# Patient Record
Sex: FEMALE | Race: WHITE | NOT HISPANIC OR LATINO | Employment: FULL TIME | URBAN - METROPOLITAN AREA
[De-identification: names, ages, dates, MRNs, and addresses within clinical notes are randomized per-mention and may not be internally consistent; named-entity substitution may affect disease eponyms.]

---

## 2018-09-17 ENCOUNTER — HISTORICAL (OUTPATIENT)
Dept: ADMINISTRATIVE | Facility: HOSPITAL | Age: 63
End: 2018-09-17

## 2018-10-22 ENCOUNTER — HISTORICAL (OUTPATIENT)
Dept: ADMINISTRATIVE | Facility: HOSPITAL | Age: 63
End: 2018-10-22

## 2018-12-10 ENCOUNTER — HISTORICAL (OUTPATIENT)
Dept: ADMINISTRATIVE | Facility: HOSPITAL | Age: 63
End: 2018-12-10

## 2019-09-09 ENCOUNTER — HISTORICAL (OUTPATIENT)
Dept: RADIOLOGY | Facility: HOSPITAL | Age: 64
End: 2019-09-09

## 2019-09-16 ENCOUNTER — HISTORICAL (OUTPATIENT)
Dept: ADMINISTRATIVE | Facility: HOSPITAL | Age: 64
End: 2019-09-16

## 2019-10-07 ENCOUNTER — HISTORICAL (OUTPATIENT)
Dept: ADMINISTRATIVE | Facility: HOSPITAL | Age: 64
End: 2019-10-07

## 2019-10-28 ENCOUNTER — HISTORICAL (OUTPATIENT)
Dept: ADMINISTRATIVE | Facility: HOSPITAL | Age: 64
End: 2019-10-28

## 2019-11-25 ENCOUNTER — HISTORICAL (OUTPATIENT)
Dept: ADMINISTRATIVE | Facility: HOSPITAL | Age: 64
End: 2019-11-25

## 2020-10-29 ENCOUNTER — HISTORICAL (OUTPATIENT)
Dept: ADMINISTRATIVE | Facility: HOSPITAL | Age: 65
End: 2020-10-29

## 2020-11-22 ENCOUNTER — OFFICE VISIT (OUTPATIENT)
Dept: URGENT CARE | Facility: CLINIC | Age: 65
End: 2020-11-22
Payer: COMMERCIAL

## 2020-11-22 VITALS
TEMPERATURE: 99 F | DIASTOLIC BLOOD PRESSURE: 85 MMHG | SYSTOLIC BLOOD PRESSURE: 139 MMHG | WEIGHT: 149 LBS | OXYGEN SATURATION: 99 % | RESPIRATION RATE: 18 BRPM | BODY MASS INDEX: 23.39 KG/M2 | HEIGHT: 67 IN | HEART RATE: 70 BPM

## 2020-11-22 DIAGNOSIS — U07.1 COVID-19 VIRUS DETECTED: ICD-10-CM

## 2020-11-22 DIAGNOSIS — Z11.9 SCREENING EXAMINATION FOR UNSPECIFIED INFECTIOUS DISEASE: ICD-10-CM

## 2020-11-22 DIAGNOSIS — U07.1 COVID-19 VIRUS DETECTED: Primary | ICD-10-CM

## 2020-11-22 LAB
CTP QC/QA: YES
SARS-COV-2 RDRP RESP QL NAA+PROBE: POSITIVE

## 2020-11-22 PROCEDURE — 3008F BODY MASS INDEX DOCD: CPT | Mod: CPTII,S$GLB,, | Performed by: FAMILY MEDICINE

## 2020-11-22 PROCEDURE — 3008F PR BODY MASS INDEX (BMI) DOCUMENTED: ICD-10-PCS | Mod: CPTII,S$GLB,, | Performed by: FAMILY MEDICINE

## 2020-11-22 PROCEDURE — 99202 PR OFFICE/OUTPT VISIT, NEW, LEVL II, 15-29 MIN: ICD-10-PCS | Mod: S$GLB,,, | Performed by: FAMILY MEDICINE

## 2020-11-22 PROCEDURE — 99202 OFFICE O/P NEW SF 15 MIN: CPT | Mod: S$GLB,,, | Performed by: FAMILY MEDICINE

## 2020-11-22 PROCEDURE — U0002: ICD-10-PCS | Mod: QW,S$GLB,, | Performed by: FAMILY MEDICINE

## 2020-11-22 PROCEDURE — U0002 COVID-19 LAB TEST NON-CDC: HCPCS | Mod: QW,S$GLB,, | Performed by: FAMILY MEDICINE

## 2020-11-22 RX ORDER — BENZONATATE 200 MG/1
200 CAPSULE ORAL 3 TIMES DAILY PRN
Qty: 30 CAPSULE | Refills: 1 | Status: SHIPPED | OUTPATIENT
Start: 2020-11-22 | End: 2020-12-02

## 2020-11-22 NOTE — PATIENT INSTRUCTIONS
You have tested positive for COVID-19 today. Per the CDC, you are now in a 10 day isolation.    This isolation starts from the day you first developed symptoms, not the day of your positive test. For example, if your symptoms began on a Monday, and you waited until Friday of the same week to get tested, and it was positive, your 10 day isolation begins from that Monday, not the Friday you tested positive.  However, if you are asymptomatic (a person who does not have any symptoms), and received a COVID-19 test that was positive, your 10 day isolation begins on the day you tested positive. This is regardless if you were exposed to a known positive days earlier. So for example, if you test positive as an asymptomatic on day 7 from exposure, you have now extended your 14 day quarantine to a 17 day quarantine.    Also, per the CDC guidelines, once your 10 days have passed, and you have not had fever greater than 100.4F in the last 24 hours without taking any fever reducers such as Tylenol (Acetaminophen) or Motrin (Ibuprofen), you may return to your normal activities including social distancing, wearing masks, and frequent handwashing - YOU DO NOT NEED ANOTHER TEST, OR TO TEST NEGATIVE, IN ORDER TO END YOUR QUARANTINE!

## 2020-11-22 NOTE — PROGRESS NOTES
"Subjective:       Patient ID: Shelby Taveras is a 65 y.o. female.    Vitals:  height is 5' 6.5" (1.689 m) and weight is 67.6 kg (149 lb). Her temperature is 99 °F (37.2 °C). Her blood pressure is 139/85 and her pulse is 70. Her respiration is 18 and oxygen saturation is 99%.     Chief Complaint: URI    65-year-old female who lives and [place] who started with some chills and a cough last night.  Her  was recently exposed to COVID, although he had a recent negative test.  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  Her  was advised to reschedule his upcoming elective surgery.  She has no comorbidities.  No history of asthma or reactive airways.    URI   This is a new problem. There has been no fever. Associated symptoms include coughing. Pertinent negatives include no congestion, ear pain, nausea, rash, sinus pain, sore throat, vomiting or wheezing. Treatments tried: otc cough.       Constitution: Positive for chills. Negative for sweating, fatigue and fever.   HENT: Negative for ear pain, congestion, sinus pain, sinus pressure, sore throat and voice change.    Neck: Negative for painful lymph nodes.   Eyes: Negative for eye redness.   Respiratory: Positive for cough. Negative for chest tightness, sputum production, bloody sputum, COPD, shortness of breath, stridor, wheezing and asthma.    Gastrointestinal: Negative for nausea and vomiting.   Musculoskeletal: Negative for muscle ache.   Skin: Negative for rash.   Allergic/Immunologic: Negative for seasonal allergies and asthma.   Hematologic/Lymphatic: Negative for swollen lymph nodes.       Objective:      Physical Exam   Constitutional: She is oriented to person, place, and time. She appears well-developed. She is cooperative.  Non-toxic appearance. She does not appear ill. No distress.   HENT:   Head: Normocephalic and atraumatic.   Ears:   Right Ear: Hearing and external ear normal.   Left Ear: Hearing and external ear normal. "   Nose: Nose normal. No mucosal edema, rhinorrhea or nasal deformity. No epistaxis. Right sinus exhibits no maxillary sinus tenderness and no frontal sinus tenderness. Left sinus exhibits no maxillary sinus tenderness and no frontal sinus tenderness.   Mouth/Throat: Uvula is midline, oropharynx is clear and moist and mucous membranes are normal. No trismus in the jaw. Normal dentition. No uvula swelling. No oropharyngeal exudate, posterior oropharyngeal edema or posterior oropharyngeal erythema.   Eyes: Conjunctivae and lids are normal. No scleral icterus.   Neck: Trachea normal, full passive range of motion without pain and phonation normal. Neck supple. No neck rigidity. No edema and no erythema present.   Cardiovascular: Normal rate, regular rhythm, normal heart sounds and normal pulses.   Pulmonary/Chest: Effort normal and breath sounds normal. No respiratory distress. She has no decreased breath sounds. She has no wheezes. She has no rhonchi. She has no rales.   Abdominal: Normal appearance.   Musculoskeletal: Normal range of motion.         General: No deformity.   Neurological: She is alert and oriented to person, place, and time. She exhibits normal muscle tone. Coordination normal.   Skin: Skin is warm, dry, intact, not diaphoretic and not pale. Psychiatric: Her speech is normal and behavior is normal. Judgment and thought content normal.   Nursing note and vitals reviewed.        Results for orders placed or performed in visit on 11/22/20   POCT COVID-19 Rapid Screening   Result Value Ref Range    POC Rapid COVID Positive (A) Negative     Acceptable Yes      Assessment:       1. COVID-19 virus detected    2. Screening examination for unspecified infectious disease        Plan:     - she is driving back to [place] today.  She was advised to touch base with her PCP in [place] tomorrow.    COVID-19 virus detected  -     benzonatate (TESSALON) 200 MG capsule; Take 1 capsule (200 mg total) by  mouth 3 (three) times daily as needed for Cough.  Dispense: 30 capsule; Refill: 1    Screening examination for unspecified infectious disease  -     POCT COVID-19 Rapid Screening    You have tested positive for COVID-19 today. Per the CDC, you are now in a 10 day isolation.    This isolation starts from the day you first developed symptoms, not the day of your positive test. For example, if your symptoms began on a Monday, and you waited until Friday of the same week to get tested, and it was positive, your 10 day isolation begins from that Monday, not the Friday you tested positive.  However, if you are asymptomatic (a person who does not have any symptoms), and received a COVID-19 test that was positive, your 10 day isolation begins on the day you tested positive. This is regardless if you were exposed to a known positive days earlier. So for example, if you test positive as an asymptomatic on day 7 from exposure, you have now extended your 14 day quarantine to a 17 day quarantine.    Also, per the CDC guidelines, once your 10 days have passed, and you have not had fever greater than 100.4F in the last 24 hours without taking any fever reducers such as Tylenol (Acetaminophen) or Motrin (Ibuprofen), you may return to your normal activities including social distancing, wearing masks, and frequent handwashing - YOU DO NOT NEED ANOTHER TEST, OR TO TEST NEGATIVE, IN ORDER TO END YOUR QUARANTINE!

## 2020-12-01 ENCOUNTER — NURSE TRIAGE (OUTPATIENT)
Dept: ADMINISTRATIVE | Facility: CLINIC | Age: 65
End: 2020-12-01

## 2020-12-01 NOTE — TELEPHONE ENCOUNTER
Covid-19 symptom tracker call back attempted, no contact made. Left VM message. Will retry in 15 mins.    Second call back, patient reports no new or worse symptoms. C/o continued cough. All other symptoms resolved. Patient had questions about quarantine guidelines. Provided information. Care advice to continue home care.     Reason for Disposition   Message left on unidentified voice mail. Phone number verified.    Additional Information   Negative: Caller has already spoken with the PCP (or office), and has no further questions   Negative: Caller has already spoken with another triager and has no further questions   Negative: Caller has already spoken with another triager or PCP (or office), and has further questions and triager able to answer questions.   Negative: Busy signal.  First attempt to contact caller.  Follow-up call scheduled within 15 minutes.   Negative: No answer.  First attempt to contact caller.  Follow-up call scheduled within 15 minutes.   Negative: Message left on identified voicemail    Protocols used: NO CONTACT OR DUPLICATE CONTACT CALL-A-[place]

## 2021-02-01 ENCOUNTER — HISTORICAL (OUTPATIENT)
Dept: ADMINISTRATIVE | Facility: HOSPITAL | Age: 66
End: 2021-02-01

## 2021-05-06 ENCOUNTER — HISTORICAL (OUTPATIENT)
Dept: RADIOLOGY | Facility: HOSPITAL | Age: 66
End: 2021-05-06

## 2021-07-01 ENCOUNTER — PATIENT MESSAGE (OUTPATIENT)
Dept: ADMINISTRATIVE | Facility: OTHER | Age: 66
End: 2021-07-01

## 2022-04-12 ENCOUNTER — HISTORICAL (OUTPATIENT)
Dept: ADMINISTRATIVE | Facility: HOSPITAL | Age: 67
End: 2022-04-12
Payer: COMMERCIAL

## 2022-04-25 ENCOUNTER — HISTORICAL (OUTPATIENT)
Dept: ADMINISTRATIVE | Facility: HOSPITAL | Age: 67
End: 2022-04-25
Payer: COMMERCIAL

## 2022-04-30 VITALS
DIASTOLIC BLOOD PRESSURE: 73 MMHG | SYSTOLIC BLOOD PRESSURE: 108 MMHG | BODY MASS INDEX: 24.1 KG/M2 | HEIGHT: 66 IN | WEIGHT: 149.94 LBS | OXYGEN SATURATION: 99 %

## 2022-05-05 NOTE — HISTORICAL [facility] CERNER
This is a historical note converted from [facility]. Formatting and pictures may have been removed.  Please reference Cerdiane for original formatting and attached multimedia. Chief Complaint  RIGHT SHOULDER PAIN DOES NOT RECALL INJURY NO PRIOR SURGERY.  History of Present Illness  Patient comes in today complaining of right shoulder pain. ?She denies any new or specific trauma. ?She has noticed some pain over the last couple weeks on her right shoulder mainly when she sleeps on it at night. ?She denies any numbness or tingling she denies any neck pain. ?She is also been having some pain especially with overhead activities, heavy lifting. ?She is tried some rest?medication without relief.  Physical Exam  Vitals & Measurements  T:?97.1? ?F (Oral)? HR:?50(Peripheral)? BP:?120/71?  HT:?167.00?cm? WT:?66.000?kg? BMI:?23.67?  Right upper extremity compartments are soft and warm. ?Skin is intact. ?There are no signs or symptoms of DVT or infection. ?She is tender along the lateral and posterior lateral aspect of the right shoulder?mildly positive Herr sign negative drop arm she is able to forward flex and abduct 165 degrees negative apprehension negative relocation negative sulcus negative OBriens?she is neurovascular tact distally. ?She is nontender elsewhere over the elbow wrist and hand. ?X-rays 3 views right shoulder demonstrate some underlying impingement.  Assessment/Plan  1.?Impingement syndrome of right shoulder?M75.41  ?At this time we discussed her physical exam and x-ray findings. ?I do suspect some rotator cuff tendinitis, underlying bursitis and impingement. ?She was given a pamphlet on shoulder range of motion strengthening exercises we have discussed anti-inflammatories with appropriate cautions versus a topical cream. ?She would like to hold off on injection. ?Patient states she will call return sooner if there is any new problems or difficulties.  Ordered:  1036F Current tobacco non-user, 10/29/20 16:07:00  CDT  1111F- Medication reconciliation completed within 30 days of an inpatient discharge to home, 10/29/20 16:07:00 CDT  1125F Pain severity quantified, pain present, 10/29/20 16:07:00 CDT  3008F Body Mass Index (BMI), documented, 10/29/20 16:07:00 CDT  3074F Most recent systolic blood pressure, less than 130 mm Hg, 10/29/20 16:07:00 CDT  3078F Most recent diastolic blood pressure, less than 80 mm Hg, 10/29/20 16:07:00 CDT  Office/Outpatient Visit Level 3 Established [facility], Impingement syndrome of right shoulder  Bursitis of right shoulder, [facility], 10/29/20 16:07:00 CDT  ?  2.?Bursitis of right shoulder?M75.51  Ordered:  1036F Current tobacco non-user, 10/29/20 16:07:00 CDT  1111F- Medication reconciliation completed within 30 days of an inpatient discharge to home, 10/29/20 16:07:00 CDT  1125F Pain severity quantified, pain present, 10/29/20 16:07:00 CDT  3008F Body Mass Index (BMI), documented, 10/29/20 16:07:00 CDT  3074F Most recent systolic blood pressure, less than 130 mm Hg, 10/29/20 16:07:00 CDT  3078F Most recent diastolic blood pressure, less than 80 mm Hg, 10/29/20 16:07:00 CDT  Office/Outpatient Visit Level 3 Established [facility], Impingement syndrome of right shoulder  Bursitis of right shoulder, [facility], 10/29/20 16:07:00 CDT  ?   Problem List/Past Medical History  Ongoing  No chronic problems  Historical  No qualifying data  Procedure/Surgical History  Application of long arm splint (shoulder to hand). (02/13/2015)  Application of splint (02/13/2015)  tonsillectomy   Medications  No active medications  Allergies  No Known Allergies  Social History  Abuse/Neglect  No, 10/29/2020  Alcohol - Low Risk, 02/13/2015  Current, 1-2 times per year, 09/17/2018  Employment/School  Employed, Work/School description: ., 09/17/2018  Substance Use  Never, 09/17/2018  Tobacco - Denies Tobacco Use, 02/13/2015  Never (less than 100 in lifetime), N/A, 10/29/2020  Family  History  Family history is negative  Immunizations  Vaccine Date Status   tetanus/diphtheria/pertussis, acel(Tdap) 02/13/2015 Given   Health Maintenance  Health Maintenance  ???Pending?(in the next year)  ??? ??OverDue  ??? ? ? ?Depression Screening due??12/10/19??and every 1??year(s)  ??? ? ? ?Alcohol Misuse Screening due??01/02/20??and every 1??year(s)  ??? ??Due?  ??? ? ? ?Influenza Vaccine due??10/01/20??and every 1??day(s)  ??? ? ? ?ADL Screening due??10/30/20??and every 1??year(s)  ??? ? ? ?Aspirin Therapy for CVD Prevention due??10/30/20??and every 1??year(s)  ??? ? ? ?Breast Cancer Screening due??10/30/20??Unknown Frequency  ??? ? ? ?Colorectal Screening due??10/30/20??Unknown Frequency  ??? ? ? ?Diabetes Screening due??10/30/20??Unknown Frequency  ??? ? ? ?Zoster Vaccine due??10/30/20??Unknown Frequency  ??? ??Due In Future?  ??? ? ? ?Obesity Screening not due until??01/01/21??and every 1??year(s)  ??? ? ? ?Blood Pressure Screening not due until??10/29/21??and every 1??year(s)  ??? ? ? ?Body Mass Index Check not due until??10/29/21??and every 1??year(s)  ???Satisfied?(in the past 1 year)  ??? ??Satisfied?  ??? ? ? ?Blood Pressure Screening on??10/29/20.??Satisfied by Ivon Martines LPN  ??? ? ? ?Body Mass Index Check on??10/29/20.??Satisfied by Ivon Martines LPN  ??? ? ? ?Cervical Cancer Screening on??06/16/20.??Satisfied by Amy Hebert  ??? ? ? ?Influenza Vaccine on??12/13/19.??Satisfied by Marcell TAI, Tyesha SCOTT  ??? ? ? ?Obesity Screening on??10/29/20.??Satisfied by Conrad CUELLO, Ivon BARNETT  ?

## 2022-05-05 NOTE — HISTORICAL [facility] C[facility]
This is a historical note converted from Naomi. Formatting and pictures may have been removed.  Please reference Naomi for original formatting and attached multimedia. Chief Complaint  1 MONTH F/U RT DISTAL FIB FX, NO COMPLAINTS, IMPROVING WITH THERAPY  History of Present Illness  Patient returns today for repeat exam. ?Patient states her right ankle is doing much better. ?She is going to physical therapy as she is back to normal shoes. ?She denies any new complaints. ?She is 2+ months from her right ankle injury.  Physical Exam  Vitals & Measurements  T:?37? ?C (Oral)? HR:?58(Peripheral)? RR:?20? BP:?124/72?  HT:?167?cm? WT:?66?kg? BMI:?23.67?  Right lower extremity compartments are soft and warm. ?Skin is intact with no signs or symptoms of DVT or infection. ?She is nontender medially. ?She has very minimal if any tenderness laterally. ?She has 35 degrees of ankle motion she is stable to stressing?she is neurovascular tact distally she is walking with appropriate gait. ?X-rays 2 views of the right tib-fib demonstrate a healed distal fibula fracture  Assessment/Plan  1.?Fracture of lateral malleolus of right fibula?S82.61XA,?Fracture of lateral malleolus of right fibula?S82.61XA  ?At this time we discussed her physical exam and?x-ray findings. ?She has healed nicely she will continue low impact activities. ?She will finish out her therapy. ?I would like to see her back with any problems or difficulties.   Problem List/Past Medical History  Ongoing  No chronic problems  Historical  No qualifying data  Procedure/Surgical History  Application of long arm splint (shoulder to hand). (02/13/2015)  Application of splint (02/13/2015)  tonsillectomy   Medications  No active medications  Allergies  No Known Allergies  Social History  Abuse/Neglect  No, 12/13/2019  Alcohol - Low Risk, 02/13/2015  Current, 1-2 times per year, 09/17/2018  Employment/School  Employed, Work/School description: .,  09/17/2018  Substance Use  Never, 09/17/2018  Tobacco - Denies Tobacco Use, 02/13/2015  Never (less than 100 in lifetime), N/A, 12/13/2019  Family History  Family history is negative  Immunizations  Vaccine Date Status   tetanus/diphtheria/pertussis, acel(Tdap) 02/13/2015 Given   Health Maintenance  Health Maintenance  ???Pending?(in the next year)  ??? ??Due?  ??? ? ? ?Depression Screening due??12/10/19??and every 1??year(s)  ??? ? ? ?ADL Screening due??12/31/19??and every 1??year(s)  ??? ? ? ?Aspirin Therapy for CVD Prevention due??12/31/19??and every 1??year(s)  ??? ? ? ?Breast Cancer Screening due??12/31/19??and every?  ??? ? ? ?Colorectal Screening due??12/31/19??and every?  ??? ? ? ?Diabetes Screening due??12/31/19??and every?  ??? ? ? ?Influenza Vaccine due??12/31/19??and every?  ??? ? ? ?Zoster Vaccine due??12/31/19??and every 100??year(s)  ??? ??Due In Future?  ??? ? ? ?Alcohol Misuse Screening not due until??01/01/20??and every 1??year(s)  ??? ? ? ?Obesity Screening not due until??01/01/20??and every 1??year(s)  ??? ? ? ?Blood Pressure Screening not due until??12/12/20??and every 1??year(s)  ??? ? ? ?Body Mass Index Check not due until??12/12/20??and every 1??year(s)  ???Satisfied?(in the past 1 year)  ??? ??Satisfied?  ??? ? ? ?Alcohol Misuse Screening on??09/09/19.??Satisfied by Ivon Martines LPN.  ??? ? ? ?Blood Pressure Screening on??12/13/19.??Satisfied by Tyesha Demarco  ??? ? ? ?Body Mass Index Check on??12/13/19.??Satisfied by Tyesha Demarco  ??? ? ? ?Cervical Cancer Screening on??06/11/19.??Satisfied by Latonia Naranjo  ??? ? ? ?Influenza Vaccine on??12/13/19.??Satisfied by Tyesha Demarco  ??? ? ? ?Obesity Screening on??12/13/19.??Satisfied by Tyesha Demarco  ?

## 2022-05-05 NOTE — HISTORICAL [facility] C[facility]ER
This is a historical note converted from Naomi. Formatting and pictures may have been removed.  Please reference Naomi for original formatting and attached multimedia. Chief Complaint  Distal fib dracture. Patient states she is doing good and denies any pain.  History of Present Illness  Patient returns today for repeat exam. ?Patient is 6 weeks from her right distal fibula fracture. ?She has been ambulating now for the last couple weeks in her boot. ?She states she is doing better,?she would like to hold off on treatment for her?sagittal band rupture of her hand. ?She has been going to physical therapy, she feels?she is ready to return to work tomorrow.  Physical Exam  Vitals & Measurements  T:?36.7? ?C (Oral)? HR:?60(Peripheral)? BP:?128/72?  HT:?167?cm? WT:?66?kg? BMI:?23.67?  Right lower extremity compartments are soft and warm. ?Skin is intact with no signs or symptoms of DVT or infection. ?There is no bruising or swelling she has 40 degrees of ankle motion she is stable to stressing she is nontender medially or laterally.? She is neurovascular intact distally. ?X-rays 3 views right ankle demonstrates callus formation of her distal fibula fracture.  Assessment/Plan  1.?Fracture of lateral malleolus of right fibula?S82.61XA  ?At this time we discussed her physical exam and x-ray findings. ?She is healing nicely. ?She will wean her boot, continue weightbearing as tolerated.? I would like to see her back in 4 weeks to see how she is progressing. ?We have discussed a return to work.  Ordered:  Clinic Follow up, *Est. 11/25/19 3:00:00 CST, Order for future visit, Fracture of lateral malleolus of right fibula, [facility]opa[facility]  Office/Outpatient Visit Level 3 Established [id number], Fracture of lateral malleolus of right fibula, [facility], 10/28/19 11:21:00 CDT  ?  Referrals  Clinic Follow up, *Est. 11/25/19 3:00:00 CST, Order for future visit, Fracture of lateral malleolus of right fibula, [facility]opa[facility]    Problem List/Past Medical History  Ongoing  No chronic problems  Historical  No qualifying data  Procedure/Surgical History  Application of long arm splint (shoulder to hand). (02/13/2015)  Application of splint (02/13/2015)  tonsillectomy   Medications  diclofenac 1% topical gel, 1 jeremiah, TOP, QID, PRN,? ?Not Taking per Prescriber  naproxen 500 mg oral tablet, 500 mg= 1 tab(s), Oral, BID  Allergies  No Known Allergies  Social History  Abuse/Neglect  No, 10/28/2019  Alcohol - Low Risk, 02/13/2015  Current, 1-2 times per year, 09/17/2018  Employment/School  Employed, Work/School description: ., 09/17/2018  Substance Use  Never, 09/17/2018  Tobacco - Denies Tobacco Use, 02/13/2015  Never (less than 100 in lifetime), No, 10/28/2019  Family History  Family history is negative  Immunizations  Vaccine Date Status   tetanus/diphtheria/pertussis, acel(Tdap) 02/13/2015 Given   Health Maintenance  Health Maintenance  ???Pending?(in the next year)  ??? ??Due?  ??? ? ? ?ADL Screening due??10/28/19??and every 1??year(s)  ??? ? ? ?Aspirin Therapy for CVD Prevention due??10/28/19??and every 1??year(s)  ??? ? ? ?Breast Cancer Screening due??10/28/19??and every?  ??? ? ? ?Colorectal Screening due??10/28/19??and every?  ??? ? ? ?Diabetes Screening due??10/28/19??and every?  ??? ? ? ?Influenza Vaccine due??10/28/19??and every?  ??? ? ? ?Lipid Screening due??10/28/19??and every?  ??? ? ? ?Zoster Vaccine due??10/28/19??and every 100??year(s)  ??? ??Due In Future?  ??? ? ? ?Depression Screening not due until??12/10/19??and every 1??year(s)  ??? ? ? ?Alcohol Misuse Screening not due until??01/01/20??and every 1??year(s)  ??? ? ? ?Obesity Screening not due until??01/01/20??and every 1??year(s)  ??? ? ? ?Blood Pressure Screening not due until??10/27/20??and every 1??year(s)  ??? ? ? ?Body Mass Index Check not due until??10/27/20??and every 1??year(s)  ???Satisfied?(in the past 1 year)  ??? ??Satisfied?  ??? ? ? ?Alcohol Misuse  Screening on??09/09/19.??Satisfied by Ivon Martines LPN.  ??? ? ? ?Blood Pressure Screening on??10/28/19.??Satisfied by Senait Omalley  ??? ? ? ?Body Mass Index Check on??10/28/19.??Satisfied by Senait Omalley  ??? ? ? ?Cervical Cancer Screening on??06/11/19.??Satisfied by Latonia Naranjo  ??? ? ? ?Depression Screening on??12/10/18.??Satisfied by Felipa Lugo  ??? ? ? ?Influenza Vaccine on??12/10/18.??Satisfied by Felipa Lugo  ??? ? ? ?Obesity Screening on??10/28/19.??Satisfied by Senait Omalley  ?

## 2022-05-05 NOTE — HISTORICAL [facility]
This is a historical note converted from Naomi. Formatting and pictures may have been removed.  Please reference Naomi for original formatting and attached multimedia. Chief Complaint  1 week Right distal fib fx  History of Present Illness  Patient returns today for repeat exam.? Patient approximately 10 days from her right distal?fibula fracture. ?Patient states that is feeling better she has been nonweightbearing.  Physical Exam  Vitals & Measurements  T:?36.7? ?C (Oral)? HR:?60(Peripheral)? BP:?128/72?  HT:?167?cm? WT:?66?kg? BMI:?23.67?  Right lower extremity form soft and warm. ?Skin is intact with no signs or symptoms of DVT or infection of bruising and swelling?is almost completely resolved. ?She is mainly tender laterally. ?She has very minimal tenderness medially. ?She has 20 degrees of ankle motion?she is neurovascular intact distally.? X-rays 3 views of the right?ankle demonstrates minimal displacement of her lateral malleolus fracture.  Assessment/Plan  1.?Fracture of lateral malleolus of right ankle?S82.61XA  ?At this time we discussed her physical exam and x-ray findings.? She will be placed in a well-padded short leg cast she will be nonweightbearing I would like to see her back in 3 weeks to see how she is progressing. ?In regards to her right hand sagittal band rupture, she will continue in therapy.  Ordered:  Clinic Follow up, *Est. 10/07/19 3:00:00 CDT, Order for future visit, Fracture of lateral malleolus of right ankle  Sagittal band rupture at metacarpophalangeal joint, Orthopaedics  Office/Outpatient Visit Level 3 Established [id number] PC, Fracture of lateral malleolus of right ankle  Sagittal band rupture at metacarpophalangeal joint, Orthopa[facility]ics Clinic, 09/16/19 11:05:00 CDT  ?  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 10/07/19 3:00:00 CDT, Order for future visit, Fracture of lateral malleolus of right ankle  Sagittal band  rupture at metacarpophalangeal joint, [facility]  Office/Outpatient Visit Level 3 Established [id number] PC, Fracture of lateral malleolus of right ankle  Sagittal band rupture at metacarpophalangeal joint, Orthopaedics Clinic, 09/16/19 11:05:00 CDT  ?  Orders:  1125F Pain severity quantified, pain present, 09/16/19 11:05:00 CDT  3008F Body Mass Index (BMI), documented, 09/16/19 11:05:00 CDT  3074F Most recent systolic blood pressure, less than 130 mm Hg, 09/16/19 11:05:00 CDT  3078F Most recent diastolic blood pressure, less than 80 mm Hg, 09/16/19 11:05:00 CDT  Short leg - jeremiah cast PC, 09/16/19 11:09:00 CDT, [facility], Routine, 09/16/19 11:09:00 CDT  Referrals  Clinic Follow up, *Est. 10/07/19 3:00:00 CDT, Order for future visit, Fracture of lateral malleolus of right ankle  Sagittal band rupture at metacarpophalangeal joint, [facility]   Problem List/Past Medical History  Ongoing  No qualifying data  Historical  No qualifying data  Procedure/Surgical History  Application of long arm splint (shoulder to hand). (02/13/2015)  Application of splint (02/13/2015)  tonsillectomy   Medications  diclofenac 1% topical gel, 1 jeremiah, TOP, QID, PRN,? ?Not Taking per Prescriber  naproxen 500 mg oral tablet, 500 mg= 1 tab(s), Oral, BID  Allergies  No Known Allergies  Social History  Abuse/Neglect  No, No, Yes, 09/16/2019  Alcohol - Low Risk, 02/13/2015  Current, 1-2 times per year, 09/17/2018  Employment/School  Employed, Work/School description: ., 09/17/2018  Substance Use  Never, 09/17/2018  Tobacco - Denies Tobacco Use, 02/13/2015  Never (less than 100 in lifetime), N/A, 09/16/2019  Family History  Family history is negative  Immunizations  Vaccine Date Status   tetanus/diphtheria/pertussis, acel(Tdap) 02/13/2015 Given   Health Maintenance  Health Maintenance  ???Pending?(in the next year)  ??? ??Due?  ??? ? ? ?ADL Screening due??09/17/19??and every 1??year(s)  ??? ? ? ?Aspirin Therapy for CVD  Prevention due??09/17/19??and every 1??year(s)  ??? ? ? ?Breast Cancer Screening due??09/17/19??and every?  ??? ? ? ?Colorectal Screening due??09/17/19??and every?  ??? ? ? ?Diabetes Screening due??09/17/19??and every?  ??? ? ? ?Influenza Vaccine due??09/17/19??and every?  ??? ? ? ?Lipid Screening due??09/17/19??and every?  ??? ? ? ?Zoster Vaccine due??09/17/19??and every 100??year(s)  ??? ??Due In Future?  ??? ? ? ?Depression Screening not due until??12/10/19??and every 1??year(s)  ??? ? ? ?Alcohol Misuse Screening not due until??01/01/20??and every 1??year(s)  ??? ? ? ?Obesity Screening not due until??01/01/20??and every 1??year(s)  ??? ? ? ?Blood Pressure Screening not due until??09/15/20??and every 1??year(s)  ??? ? ? ?Body Mass Index Check not due until??09/15/20??and every 1??year(s)  ???Satisfied?(in the past 1 year)  ??? ??Satisfied?  ??? ? ? ?Alcohol Misuse Screening on??09/09/19.??Satisfied by Ivon Martines LPN.  ??? ? ? ?Blood Pressure Screening on??09/16/19.??Satisfied by Ivon Martines LPN.  ??? ? ? ?Body Mass Index Check on??09/16/19.??Satisfied by Ivon Martines LPN.  ??? ? ? ?Cervical Cancer Screening on??06/11/19.??Satisfied by Latonia Naranjo  ??? ? ? ?Depression Screening on??12/10/18.??Satisfied by Felipa Lugo  ??? ? ? ?Influenza Vaccine on??12/10/18.??Satisfied by Felipa Lugo  ??? ? ? ?Obesity Screening on??09/16/19.??Satisfied by Ivon Martines LPN  ?

## 2022-05-05 NOTE — HISTORICAL [facility]
This is a historical note converted from Naomi. Formatting and pictures may have been removed.  Please reference Naomi for original formatting and attached multimedia. Chief Complaint  RIGHT WRIST SHE HAS A LUMP. SHES BEEN EXERCISING AND NOT SURE IF SHE POPPED SOMETHING. HAPPENED ABOUT 3 WEEKS AGO THAT SHE NOTICED IT.  History of Present Illness  Patient comes in today complaining of right?wrist?pain. ?Patient states she has been exercising more recently and she has noticed a small mass along the volar aspect of the right wrist. ?She is right-hand dominant. ?She states?she has noticed it about 3 weeks ago. ?She denies any numbness or tingling she denies any other masses.  Physical Exam  Vitals & Measurements  BP:?128/72?  HT:?167?cm? HT:?167?cm? WT:?66?kg? WT:?66?kg? BMI:?23.67?  Right upper extremity form soft and warm. ?Skin is intact with no signs or symptoms of DVT or infection. ?Examination of the right wrist and hand she does have a volar?ganglion cyst about the right wrist. ?Is is near the radial artery. ?There is no palpable bruit. ?She has a negative Tinels in this area. ?It appears to be fluid in nature.? There is no other masses appreciated. ?She has very minimal tenderness in the over this area. ?There is no skin changes. ?She has appropriate flexion tension pronation supination?she is neurovascular intact distally. ?X-rays 3 views of the right wrist demonstrate no fracture dislocation or obvious bony abnormality.  Assessment/Plan  1.?Ganglion cyst of volar aspect of right wrist?M67.431  ? At this time we discussed her physical exam and x-ray findings. ?Clinically it appears like a?volar ganglion cyst. ?We have discussed various treatment options including observation aspiration and excision.? We will continue to observe her cyst.? She understand to call or return sooner if there is any change in size or pain. ?We also discussed?if she would like it drained,?please call, she was given a pamphlet on  volar ganglion cyst.? Patient states she will call or return sooner if there is any new problems or difficulties.? We have also discussed surgical excision as well as the recurrence rate.  Ordered:  1036F Current tobacco non-user, 12/10/18 16:22:00 CST  1125F Pain severity quantified, pain present, 12/10/18 16:22:00 CST  1170F Functional Status Assessment, 12/10/18 16:22:00 CST  3008F Body Mass Index (BMI), documented, 12/10/18 16:22:00 CST  3074F Most recent systolic blood pressure, less than 130 mm Hg, 12/10/18 16:22:00 CST  3078F Most recent diastolic blood pressure, less than 80 mm Hg, 12/10/18 16:22:00 CST  3725F Screening for depression performed, 12/10/18 16:22:00 CST  Office/Outpatient Visit Level 3 Established [id number] PC, Ganglion cyst of volar aspect of right wrist, [facility], 12/10/18 16:22:00 CST  ?  Wrist pain?M25.539  ?  Orders:  Clinic Follow-up PRN, 12/10/18 16:22:00 CST, Future Order, [facility]   Problem List/Past Medical History  Ongoing  No qualifying data  Historical  No qualifying data  Procedure/Surgical History  tonsillectomy   Medications  diclofenac 1% topical gel, 1 jeremiah, TOP, QID, PRN,? ?Not Taking per Prescriber  Allergies  No Known Allergies  Social History  Alcohol - Low Risk, 02/13/2015  Current, 1-2 times per year, 09/17/2018  Employment/School  Employed, Work/School description: ., 09/17/2018  Substance Abuse  Never, 09/17/2018  Tobacco - Denies Tobacco Use, 02/13/2015  Never smoker, N/A, 12/10/2018  Never smoker Use:., 09/17/2018  Family History  Family history is negative  Immunizations  Vaccine Date Status   tetanus/diphtheria/pertussis, acel(Tdap) 02/13/2015 Given   Health Maintenance  Health Maintenance  ???Pending?(in the next year)  ??? ??Due?  ??? ? ? ?ADL Screening due??12/11/18??and every 1??year(s)  ??? ? ? ?Alcohol Misuse Screening due??12/11/18??and every 1??year(s)  ??? ? ? ?Aspirin Therapy for CVD Prevention due??12/11/18??and every 1??year(s)  ??? ?  ? ?Breast Cancer Screening due??12/11/18??and every?  ??? ? ? ?Colorectal Screening due??12/11/18??and every?  ??? ? ? ?Diabetes Screening due??12/11/18??and every?  ??? ? ? ?Influenza Vaccine due??12/11/18??and every?  ??? ? ? ?Lipid Screening due??12/11/18??and every?  ??? ? ? ?Zoster Vaccine due??12/11/18??and every 100??year(s)  ??? ??Due In Future?  ??? ? ? ?Blood Pressure Screening not due until??12/10/19??and every 1??year(s)  ??? ? ? ?Body Mass Index Check not due until??12/10/19??and every 1??year(s)  ??? ? ? ?Depression Screening not due until??12/10/19??and every 1??year(s)  ??? ? ? ?Obesity Screening not due until??12/10/19??and every 1??year(s)  ???Satisfied?(in the past 1 year)  ??? ??Satisfied?  ??? ? ? ?Blood Pressure Screening on??12/10/18.??Satisfied by Brittney, Felipa  ??? ? ? ?Body Mass Index Check on??12/10/18.??Satisfied by Brittney, Felipa  ??? ? ? ?Cervical Cancer Screening on??05/30/18.??Satisfied by Tatum Rose  ??? ? ? ?Depression Screening on??12/10/18.??Satisfied by Brittney, Felipa  ??? ? ? ?Influenza Vaccine on??12/10/18.??Satisfied by Brittney, Felipa  ??? ? ? ?Obesity Screening on??12/10/18.??Satisfied by Brittney, Felipa  ?  ?

## 2022-05-05 NOTE — HISTORICAL [facility]
This is a historical note converted from Naomi. Formatting and pictures may have been removed.  Please reference Naomi for original formatting and attached multimedia. Chief Complaint  Right distal fiv fx. Patient states she is doing good. Denies any pain. In cast. Ambulating by scooter.  History of Present Illness  Patient returns today for repeat exam. ?Patient is 5 weeks from her right distal fibula fracture. ?She states she is doing better. ?She would like to hold off on surgery for her sagittal band rupture.  Physical Exam  Vitals & Measurements  T:?36.7? ?C (Oral)? HR:?60(Peripheral)? BP:?128/72?  HT:?167?cm? WT:?66?kg? BMI:?23.67?  Right lower extremity compartment soft and warm. ?Skin is intact with no signs or symptoms of DVT or infection. ?She remains to be mildly tender along the lateral aspect she is nontender medially. ?She has 25 degrees of ankle motion she is stable to stressing she is neurovascular intact distally. ?X-rays 3 views right ankle?demonstrates a majority healed?right lateral malleolus fracture.  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 ?She will be placed in a 3D boot we will start weightbearing as tolerated we will also start physical therapy, she will hold off on her job duties at this time. ?I would like to see her back in 3 weeks to see how she is progressing.  Ordered:  Clinic Follow up, *Est. 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  2.?Fracture of lateral malleolus of right fibula?S82.61XA  Ordered:  Clinic Follow up, *Est. 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  Referrals  Clinic Follow up, *Est. 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 (02/13/2015)  Application of splint (02/13/2015)  tonsillectomy   Medications  diclofenac 1% topical gel, 1 jeremiah, TOP, QID, PRN,? ?Not Taking per Prescriber  naproxen 500 mg oral tablet, 500 mg= 1 tab(s), Oral, BID  Allergies  No Known Allergies  Social History  Abuse/Neglect  No, 10/07/2019  Alcohol - Low Risk, 02/13/2015  Current, 1-2 times per year, 09/17/2018  Employment/School  Employed, Work/School description: ., 09/17/2018  Substance Use  Never, 09/17/2018  Tobacco - Denies Tobacco Use, 02/13/2015  Never (less than 100 in lifetime), No, 10/07/2019  Family History  Family history is negative  Immunizations  Vaccine Date Status   tetanus/diphtheria/pertussis, acel(Tdap) 02/13/2015 Given   Health Maintenance  Health Maintenance  ???Pending?(in the next year)  ??? ??Due?  ??? ? ? ?ADL Screening due??10/07/19??and every 1??year(s)  ??? ? ? ?Aspirin Therapy for CVD Prevention  due??10/07/19??and every 1??year(s)  ??? ? ? ?Breast Cancer Screening due??10/07/19??and every?  ??? ? ? ?Colorectal Screening due??10/07/19??and every?  ??? ? ? ?Diabetes Screening due??10/07/19??and every?  ??? ? ? ?Influenza Vaccine due??10/07/19??and every?  ??? ? ? ?Lipid Screening due??10/07/19??and every?  ??? ? ? ?Zoster Vaccine due??10/07/19??and every 100??year(s)  ??? ??Due In Future?  ??? ? ? ?Depression Screening not due until??12/10/19??and every 1??year(s)  ??? ? ? ?Alcohol Misuse Screening not due until??01/01/20??and every 1??year(s)  ??? ? ? ?Obesity Screening not due until??01/01/20??and every 1??year(s)  ??? ? ? ?Blood Pressure Screening not due until??09/15/20??and every 1??year(s)  ??? ? ? ?Body Mass Index Check not due until??09/15/20??and every 1??year(s)  ???Satisfied?(in the past 1 year)  ??? ??Satisfied?  ??? ? ? ?Alcohol Misuse Screening on??09/09/19.??Satisfied by Ivon Martines LPN  ??? ? ? ?Blood Pressure Screening on??10/07/19.??Satisfied by Senait Omalley  ??? ? ? ?Body Mass Index Check on??10/07/19.??Satisfied by Senait Omalley  ??? ? ? ?Cervical Cancer Screening on??06/11/19.??Satisfied by Latonia Naranjo  ??? ? ? ?Depression Screening on??12/10/18.??Satisfied by Felipa Lugo  ??? ? ? ?Influenza Vaccine on??12/10/18.??Satisfied by Felipa Lugo  ??? ? ? ?Obesity Screening on??10/07/19.??Satisfied by Senait Omalley  ?

## 2022-05-05 NOTE — HISTORICAL [facility]
This is a historical note converted from Naomi. Formatting and pictures may have been removed.  Please reference Cerdiane for original formatting and attached multimedia. Chief Complaint  Patient is here for left hip pain, patient denies any trauma, states she has diff sleeping walking, better woith rest and neproxen, states often hears pooping/clicking sounds . ... mjgil ccma  History of Present Illness  Patient returns today complaint of left hip pain. ?Patient states she has pain on the outside part of her left hip. ?She occasion will feel a pop which is nonpainful. ?She denies any specific groin pain?she denies any radiculopathy numbness or tingling. ?She tried some rest medication with minimal relief.  Physical Exam  Vitals & Measurements  HR:?65(Peripheral)? BP:?117/76?  HT:?167.00?cm? WT:?68.000?kg? BMI:?24.38?  Left lower extremity compartments are soft and warm. ?Skin is intact. ?No signs symptoms of DVT or infection. ?She is tender along the lateral aspect of the left hip she has no pain with?logrolling of the left hip she has a negative Stincfield exam she has no obvious groin pain. ?There is no long track signs. ?She does have some pain?and tenderness along the IT band more proximally. ?She is neurovascular tact distally. ?X-rays?2 views left hip demonstrates minimal arthritic changes.  Assessment/Plan  1.?Hip bursitis, left?M70.72  ?At this time we discussed her physical exam and x-ray findings.? Patient has some underlying arthritis, mildly symptomatic about her hip bursitis. ?We have also discussed the possible labral tear?given her a pop. ?We have discussed some low impact activities physical therapy anti-inflammatories with appropriate precautions. ?I like to see her back in 4 weeks to see how she is progressing. ?We have discussed an MRI if she does not improve.  2.?Labral tear of hip joint?S73.199A  3.?Osteoarthritis of left hip?M16.12  Referrals  Clinic Follow up, *Est. 03/01/21 3:00:00 CST,  Order for future visit, Hip bursitis, left  Labral tear of hip joint  Osteoarthritis of left hip, [facility]  PT/OT External Referral, 02/01/21 15:58:00 CST, Hip bursitis, left  Labral tear of hip joint  Osteoarthritis of left hip, Evaluate and Treat, 3 X Week   Problem List/Past Medical History  Ongoing  No chronic problems  Historical  No qualifying data  Procedure/Surgical History  Application of long arm splint (shoulder to hand). (02/13/2015)  Application of splint (02/13/2015)  tonsillectomy   Medications  benzonatate 200 mg oral capsule, 200 mg= 1 cap(s), Oral, TID  bimatoprost 0.03% ophthalmic solution, qPM  ciclopirox 8% topical solution, TOP, Daily  Medrol Dosepak 4 mg oral tablet, 1 packet(s), Oral, As Directed  meloxicam 7.5 mg oral tablet, 7.5 mg= 1 tab(s), Oral, qAM  tretinoin 0.1% topical cream, TOP, qPM  Allergies  No Known Allergies  Social History  Abuse/Neglect  No, 02/01/2021  Alcohol - Low Risk, 02/13/2015  Current, 1-2 times per month, 02/01/2021  Employment/School  Employed, Work/School description: ., 09/17/2018  Substance Use  Never, 09/17/2018  Tobacco - Denies Tobacco Use, 02/13/2015  Never (less than 100 in lifetime), N/A, 02/01/2021  Family History  Family history is negative  Immunizations  Vaccine Date Status   tetanus/diphtheria/pertussis, acel(Tdap) 02/13/2015 Given   Health Maintenance  Health Maintenance  ???Pending?(in the next year)  ??? ??OverDue  ??? ? ? ?Influenza Vaccine due??10/01/20??and every 1??day(s)  ??? ??Due?  ??? ? ? ?Alcohol Misuse Screening due??01/02/21??and every 1??year(s)  ??? ? ? ?ADL Screening due??02/03/21??and every 1??year(s)  ??? ? ? ?Aspirin Therapy for CVD Prevention due??02/03/21??and every 1??year(s)  ??? ? ? ?Breast Cancer Screening due??02/03/21??Unknown Frequency  ??? ? ? ?Colorectal Screening due??02/03/21??Unknown Frequency  ??? ? ? ?Zoster Vaccine due??02/03/21??Unknown Frequency  ??? ??Due In Future?  ??? ? ? ?Obesity  Screening not due until??01/01/22??and every 1??year(s)  ??? ? ? ?Blood Pressure Screening not due until??02/01/22??and every 1??year(s)  ??? ? ? ?Body Mass Index Check not due until??02/01/22??and every 1??year(s)  ??? ? ? ?Depression Screening not due until??02/01/22??and every 1??year(s)  ???Satisfied?(in the past 1 year)  ??? ??Satisfied?  ??? ? ? ?Blood Pressure Screening on??02/01/21.??Satisfied by Adriana Resendez  ??? ? ? ?Body Mass Index Check on??02/01/21.??Satisfied by Adriana Resendez  ??? ? ? ?Cervical Cancer Screening on??06/16/20.??Satisfied by Amy Hebert  ??? ? ? ?Depression Screening on??02/01/21.??Satisfied by Adriana Resendez  ??? ? ? ?Influenza Vaccine on??02/01/21.??Satisfied by Adriana Resendez  ??? ? ? ?Lipid Screening on??10/29/20.  ??? ? ? ?Obesity Screening on??02/01/21.??Satisfied by Adirana Resendez  ?      Date of exam was February 1, 2021

## 2022-05-05 NOTE — HISTORICAL [facility]
This is a historical note converted from Cerdiane. Formatting and pictures may have been removed.  Please reference Cerdiane for original formatting and attached multimedia. Chief Complaint  RT ANKLE, XRAYS DONE TODAY...JS  History of Present Illness  Patient returns today for repeat exam. ?Patient states her right ankle is feeling much better. ?She states the swelling has gone down she states it?does not bother her?but very rarely. ?She states it is not bad enough to take medication. ?She is wearing normal shoes she denies any new complaints.  Physical Exam  Vitals & Measurements  HT:?167?cm? HT:?167?cm? WT:?66?kg? WT:?66?kg? BMI:?23.67?  Right lower externally form soft and warm. ?Skin is intact with no signs or symptoms of DVT or infection. ?She is much less tender along the medial malleolus area. ?She has 35 degrees of ankle motion she is nontender laterally she is stable to stressing sensation is intact to light touch brisk cap refill. ?X-rays 3 views of the right?ankle demonstrates no obvious fracture or dislocation.  Assessment/Plan  1.?Stress fracture  ? At this time we discussed her physical exam and x-ray findings.? There is no obvious continuation of her suspected?lucent line over her medial malleolus. ?I do not see a fracture today.? Symptomatically she has greatly improved. ?She will continue low impact activities we have discussed anti-inflammatories with appropriate precautions. ?Patient states she will call or return sooner if there is any new problems or difficulties.  Ordered:  diclofenac topical, 1 jeremiah, TOP, QID, PRN PRN as needed for pain, # 100 gm, 0 Refill(s), Pharmacy: [id number]  1037F Current tobacco non-user, 10/22/18 11:46:00 CDT  1170F Functional Status Assessment, 10/22/18 11:46:00 CDT  3008F Body Mass Index (BMI), documented, 10/22/18 11:46:00 CDT  3725F Screening for depression performed, 10/22/18 11:46:00 CDT  Office/Outpatient Visit Level 3 Established 68296 PC, Stress  fracture  Right ankle sprain, LGMD AMB - [facility], 10/22/18 11:46:00 CDT  ?  2.?Right ankle sprain  Ordered:  diclofenac topical, 1 jeremiah, TOP, QID, PRN PRN as needed for pain, # 100 gm, 0 Refill(s), Pharmacy: [facility] #[id number]  1036F Current tobacco non-user, 10/22/18 11:46:00 CDT  1170F Functional Status Assessment, 10/22/18 11:46:00 CDT  3008F Body Mass Index (BMI), documented, 10/22/18 11:46:00 CDT  3725F Screening for depression performed, 10/22/18 11:46:00 CDT  Office/Outpatient Visit Level 3 Established 76000 PC, Stress fracture  Right ankle sprain, [facility]MD AMB - [facility], 10/22/18 11:46:00 CDT  ?  Pain in right ankle and joints of right foot  ?  Orders:  Clinic Follow-up PRN, 10/22/18 11:46:00 CDT, Future Order, [facility]MD [facility]   Problem List/Past Medical History  Ongoing  No qualifying data  Historical  No qualifying data  Procedure/Surgical History  Application of long arm splint (shoulder to hand). (02/13/2015)  Application of splint (02/13/2015)  tonsillectomy   Medications  diclofenac 1% topical gel, 1 jeremiah, TOP, QID, PRN  Allergies  No Known Allergies  Social History  Alcohol - Low Risk, 02/13/2015  Current, 1-2 times per year, 09/17/2018  Employment/School  Employed, Work/School description: ., 09/17/2018  Substance Abuse  Never, 09/17/2018  Tobacco - Denies Tobacco Use, 02/13/2015  Never smoker Use:., 09/17/2018  Family History  Family history is negative  Immunizations  Vaccine Date Status   tetanus/diphtheria/pertussis, acel(Tdap) 02/13/2015 Given   Health Maintenance  Health Maintenance  ???Pending?(in the next year)  ??? ??Due?  ??? ? ? ?ADL Screening due??10/23/18??and every 1??year(s)  ??? ? ? ?Alcohol Misuse Screening due??10/23/18??and every 1??year(s)  ??? ? ? ?Aspirin Therapy for CVD Prevention due??10/23/18??and every 1??year(s)  ??? ? ? ?Breast Cancer Screening due??10/23/18??and every?  ??? ? ? ?Colorectal Screening due??10/23/18??and every?  ??? ? ? ?Diabetes  Screening due??10/23/18??and every?  ??? ? ? ?Influenza Vaccine due??10/23/18??and every?  ??? ? ? ?Lipid Screening due??10/23/18??and every?  ??? ? ? ?Zoster Vaccine due??10/23/18??and every 100??year(s)  ??? ??Due In Future?  ??? ? ? ?Blood Pressure Screening not due until??09/17/19??and every 1??year(s)  ??? ? ? ?Body Mass Index Check not due until??10/22/19??and every 1??year(s)  ??? ? ? ?Depression Screening not due until??10/22/19??and every 1??year(s)  ??? ? ? ?Obesity Screening not due until??10/22/19??and every 1??year(s)  ???Satisfied?(in the past 1 year)  ??? ??Satisfied?  ??? ? ? ?Blood Pressure Screening on??09/17/18.??Satisfied by Gloria Watson.  ??? ? ? ?Body Mass Index Check on??10/22/18.??Satisfied by Gloria Watson.  ??? ? ? ?Cervical Cancer Screening on??05/30/18.??Satisfied by Tatum Rose  ??? ? ? ?Depression Screening on??10/22/18.??Satisfied by Gloria Watson.  ??? ? ? ?Influenza Vaccine on??10/22/18.??Satisfied by Gloria Watson.  ??? ? ? ?Obesity Screening on??10/22/18.??Satisfied by Gloria Watson.  ?  ?

## 2022-05-12 ENCOUNTER — OFFICE VISIT (OUTPATIENT)
Dept: ORTHOPEDICS | Facility: CLINIC | Age: 67
End: 2022-05-12
Payer: COMMERCIAL

## 2022-05-12 ENCOUNTER — CLINICAL SUPPORT (OUTPATIENT)
Dept: RESPIRATORY THERAPY | Facility: HOSPITAL | Age: 67
End: 2022-05-12
Attending: ORTHOPAEDIC SURGERY
Payer: COMMERCIAL

## 2022-05-12 ENCOUNTER — LAB VISIT (OUTPATIENT)
Dept: LAB | Facility: HOSPITAL | Age: 67
End: 2022-05-12
Attending: ORTHOPAEDIC SURGERY
Payer: COMMERCIAL

## 2022-05-12 VITALS
HEART RATE: 71 BPM | DIASTOLIC BLOOD PRESSURE: 73 MMHG | TEMPERATURE: 98 F | HEIGHT: 66 IN | BODY MASS INDEX: 23.82 KG/M2 | WEIGHT: 148.19 LBS | SYSTOLIC BLOOD PRESSURE: 120 MMHG

## 2022-05-12 DIAGNOSIS — M25.811 SHOULDER IMPINGEMENT, RIGHT: ICD-10-CM

## 2022-05-12 DIAGNOSIS — S46.011D TRAUMATIC COMPLETE TEAR OF RIGHT ROTATOR CUFF, SUBSEQUENT ENCOUNTER: ICD-10-CM

## 2022-05-12 DIAGNOSIS — S43.004D SHOULDER DISLOCATION, RIGHT, SUBSEQUENT ENCOUNTER: ICD-10-CM

## 2022-05-12 DIAGNOSIS — S43.431D LABRAL TEAR OF SHOULDER, RIGHT, SUBSEQUENT ENCOUNTER: ICD-10-CM

## 2022-05-12 DIAGNOSIS — S46.011D TRAUMATIC COMPLETE TEAR OF RIGHT ROTATOR CUFF, SUBSEQUENT ENCOUNTER: Primary | ICD-10-CM

## 2022-05-12 LAB
ALBUMIN SERPL-MCNC: 4.1 GM/DL (ref 3.4–4.8)
ALBUMIN/GLOB SERPL: 1.1 RATIO (ref 1.1–2)
ALP SERPL-CCNC: 32 UNIT/L (ref 40–150)
ALT SERPL-CCNC: 17 UNIT/L (ref 0–55)
AST SERPL-CCNC: 19 UNIT/L (ref 5–34)
BASOPHILS # BLD AUTO: 0.07 X10(3)/MCL (ref 0–0.2)
BASOPHILS NFR BLD AUTO: 0.7 %
BILIRUBIN DIRECT+TOT PNL SERPL-MCNC: 0.4 MG/DL
BUN SERPL-MCNC: 20 MG/DL (ref 9.8–20.1)
CALCIUM SERPL-MCNC: 10.1 MG/DL (ref 8.4–10.2)
CHLORIDE SERPL-SCNC: 106 MMOL/L (ref 98–107)
CO2 SERPL-SCNC: 25 MMOL/L (ref 23–31)
CREAT SERPL-MCNC: 0.77 MG/DL (ref 0.55–1.02)
EOSINOPHIL # BLD AUTO: 0.08 X10(3)/MCL (ref 0–0.9)
EOSINOPHIL NFR BLD AUTO: 0.8 %
ERYTHROCYTE [DISTWIDTH] IN BLOOD BY AUTOMATED COUNT: 12.3 % (ref 11.5–17)
GLOBULIN SER-MCNC: 3.7 GM/DL (ref 2.4–3.5)
GLUCOSE SERPL-MCNC: 93 MG/DL (ref 82–115)
HCT VFR BLD AUTO: 44.4 % (ref 37–47)
HGB BLD-MCNC: 14.4 GM/DL (ref 12–16)
IMM GRANULOCYTES # BLD AUTO: 0.04 X10(3)/MCL (ref 0–0.02)
IMM GRANULOCYTES NFR BLD AUTO: 0.4 % (ref 0–0.43)
LYMPHOCYTES # BLD AUTO: 2.09 X10(3)/MCL (ref 0.6–4.6)
LYMPHOCYTES NFR BLD AUTO: 20.6 %
MCH RBC QN AUTO: 29.7 PG (ref 27–31)
MCHC RBC AUTO-ENTMCNC: 32.4 MG/DL (ref 33–36)
MCV RBC AUTO: 91.5 FL (ref 80–94)
MONOCYTES # BLD AUTO: 0.62 X10(3)/MCL (ref 0.1–1.3)
MONOCYTES NFR BLD AUTO: 6.1 %
NEUTROPHILS # BLD AUTO: 7.2 X10(3)/MCL (ref 2.1–9.2)
NEUTROPHILS NFR BLD AUTO: 71.4 %
NRBC BLD AUTO-RTO: 0 %
PLATELET # BLD AUTO: 325 X10(3)/MCL (ref 130–400)
PMV BLD AUTO: 9.5 FL (ref 9.4–12.4)
POTASSIUM SERPL-SCNC: 4.7 MMOL/L (ref 3.5–5.1)
PROT SERPL-MCNC: 7.8 GM/DL (ref 5.8–7.6)
RBC # BLD AUTO: 4.85 X10(6)/MCL (ref 4.2–5.4)
SODIUM SERPL-SCNC: 145 MMOL/L (ref 136–145)
WBC # SPEC AUTO: 10.1 X10(3)/MCL (ref 4.5–11.5)

## 2022-05-12 PROCEDURE — 1159F MED LIST DOCD IN RCRD: CPT | Mod: CPTII,,, | Performed by: ORTHOPAEDIC SURGERY

## 2022-05-12 PROCEDURE — 93010 EKG 12-LEAD: ICD-10-PCS | Mod: ,,, | Performed by: INTERNAL MEDICINE

## 2022-05-12 PROCEDURE — 1160F PR REVIEW ALL MEDS BY PRESCRIBER/CLIN PHARMACIST DOCUMENTED: ICD-10-PCS | Mod: CPTII,,, | Performed by: ORTHOPAEDIC SURGERY

## 2022-05-12 PROCEDURE — 1160F RVW MEDS BY RX/DR IN RCRD: CPT | Mod: CPTII,,, | Performed by: ORTHOPAEDIC SURGERY

## 2022-05-12 PROCEDURE — 3074F PR MOST RECENT SYSTOLIC BLOOD PRESSURE < 130 MM HG: ICD-10-PCS | Mod: CPTII,,, | Performed by: ORTHOPAEDIC SURGERY

## 2022-05-12 PROCEDURE — 3074F SYST BP LT 130 MM HG: CPT | Mod: CPTII,,, | Performed by: ORTHOPAEDIC SURGERY

## 2022-05-12 PROCEDURE — 3008F PR BODY MASS INDEX (BMI) DOCUMENTED: ICD-10-PCS | Mod: CPTII,,, | Performed by: ORTHOPAEDIC SURGERY

## 2022-05-12 PROCEDURE — 80053 COMPREHEN METABOLIC PANEL: CPT

## 2022-05-12 PROCEDURE — 85025 COMPLETE CBC W/AUTO DIFF WBC: CPT

## 2022-05-12 PROCEDURE — 1159F PR MEDICATION LIST DOCUMENTED IN MEDICAL RECORD: ICD-10-PCS | Mod: CPTII,,, | Performed by: ORTHOPAEDIC SURGERY

## 2022-05-12 PROCEDURE — 3078F PR MOST RECENT DIASTOLIC BLOOD PRESSURE < 80 MM HG: ICD-10-PCS | Mod: CPTII,,, | Performed by: ORTHOPAEDIC SURGERY

## 2022-05-12 PROCEDURE — 3078F DIAST BP <80 MM HG: CPT | Mod: CPTII,,, | Performed by: ORTHOPAEDIC SURGERY

## 2022-05-12 PROCEDURE — 99213 PR OFFICE/OUTPT VISIT, EST, LEVL III, 20-29 MIN: ICD-10-PCS | Mod: ,,, | Performed by: ORTHOPAEDIC SURGERY

## 2022-05-12 PROCEDURE — 1125F AMNT PAIN NOTED PAIN PRSNT: CPT | Mod: CPTII,,, | Performed by: ORTHOPAEDIC SURGERY

## 2022-05-12 PROCEDURE — 3288F FALL RISK ASSESSMENT DOCD: CPT | Mod: CPTII,,, | Performed by: ORTHOPAEDIC SURGERY

## 2022-05-12 PROCEDURE — 1101F PT FALLS ASSESS-DOCD LE1/YR: CPT | Mod: CPTII,,, | Performed by: ORTHOPAEDIC SURGERY

## 2022-05-12 PROCEDURE — 93010 ELECTROCARDIOGRAM REPORT: CPT | Mod: ,,, | Performed by: INTERNAL MEDICINE

## 2022-05-12 PROCEDURE — 99213 OFFICE O/P EST LOW 20 MIN: CPT | Mod: ,,, | Performed by: ORTHOPAEDIC SURGERY

## 2022-05-12 PROCEDURE — 1101F PR PT FALLS ASSESS DOC 0-1 FALLS W/OUT INJ PAST YR: ICD-10-PCS | Mod: CPTII,,, | Performed by: ORTHOPAEDIC SURGERY

## 2022-05-12 PROCEDURE — 93005 ELECTROCARDIOGRAM TRACING: CPT

## 2022-05-12 PROCEDURE — 1125F PR PAIN SEVERITY QUANTIFIED, PAIN PRESENT: ICD-10-PCS | Mod: CPTII,,, | Performed by: ORTHOPAEDIC SURGERY

## 2022-05-12 PROCEDURE — 36415 COLL VENOUS BLD VENIPUNCTURE: CPT

## 2022-05-12 PROCEDURE — 3288F PR FALLS RISK ASSESSMENT DOCUMENTED: ICD-10-PCS | Mod: CPTII,,, | Performed by: ORTHOPAEDIC SURGERY

## 2022-05-12 PROCEDURE — 3008F BODY MASS INDEX DOCD: CPT | Mod: CPTII,,, | Performed by: ORTHOPAEDIC SURGERY

## 2022-05-12 RX ORDER — TRETINOIN 1 MG/G
1 CREAM TOPICAL NIGHTLY
COMMUNITY
Start: 2022-01-08

## 2022-05-12 RX ORDER — SODIUM CHLORIDE 9 MG/ML
INJECTION, SOLUTION INTRAVENOUS CONTINUOUS
Status: CANCELLED | OUTPATIENT
Start: 2022-05-12

## 2022-05-12 RX ORDER — CICLOPIROX 80 MG/ML
1 SOLUTION TOPICAL DAILY
COMMUNITY
Start: 2022-01-07 | End: 2023-05-01

## 2022-05-12 NOTE — H&P
Admission History & Physical    Subjective:    CC: Pain of the Right Shoulder and Follow-up (Right Shoulder Pain, states having intermitten aching in right shoulder at this time.)       HPI:  Shelby Taveras presents today for preoperative evaluation for right shoulder arthroscopy, debridement, subacromial decompression, labral repair, and mini open rotator cuff repair. I reviewed the indications for surgery. The risks and benefits of the proposed and alternative treatments were discussed with the patient. Questions pertinent to the procedure were solicited and answered. No assurances were given. Informed consent was obtained. The patient expressed good understanding and wished to proceed with scheduling the procedure.     ROS:   Constitutional: No fever, weakness, or fatigue.   Ear/Nose/Mouth/Throat: No nasal congestion or sore throat.   Respiratory: No shortness of breath or cough.   Cardiovascular: No chest pain, palpitations, or peripheral edema.   Gastrointestinal: No nausea, vomiting, or abdominal pain.   Genitourinary: No dysuria.  Musculoskeletal:  Right shoulder pain, swelling, loss of motion, instability.    Past Surgical History:   Procedure Laterality Date    TONSILLECTOMY          History reviewed. No pertinent past medical history.     Objective:    Vitals:    05/12/22 1012   BP: 120/73   Pulse: 71   Temp: 97.6 °F (36.4 °C)        Physical Exam:    Appearance: No distress, good color on room air. Alert and cooperative.  HEENT: Normocephalic. PERRLA EOM intact.   Lungs: Breathing unlabored.  Heart: Regular rate and rhythm.  Abdomen: Soft, non-tender.  No rebound tenderness.  Extremities:  Patient is well-nourished and well-developed, in no apparent distress, pleasant and cooperative. Examination of the right upper extremity compartments are soft and warm.  Skin is intact. There are no signs or symptoms of DVT or infection.   Patient is tender to palpation along the  the anterior lateral shoulder  aspect .  Patient is able to forward flex and abduct to 45°.  Greater than this, patient has hesitation and therefore limited exam.  Positive apprehension test. Neurovascularly intact distally.  Skin: No rashes or open wounds.        Assessment:  1. Traumatic complete tear of right rotator cuff, subsequent encounter  - Full code; Standing  - Place in Outpatient; Standing  - Vital signs; Standing  - Insert peripheral IV; Standing  - Clip and Prep Other (please specifiy) (Operative site); Standing  - Cleanse with Chlorhexidine (CHG); Standing  - Diet NPO; Standing  - 0.9%  NaCl infusion  - IP VTE LOW RISK PATIENT; Standing  - Place JAKY hose; Standing  - Place sequential compression device; Standing  - ceFAZolin (ANCEF) 2 g in dextrose 5 % 50 mL IVPB  - CBC auto differential; Future  - Comprehensive metabolic panel; Future  - EKG 12-lead; Future  - Inpatient consult to Anesthesiology; Standing    2. Labral tear of shoulder, right, subsequent encounter  - Full code; Standing  - Place in Outpatient; Standing  - Vital signs; Standing  - Insert peripheral IV; Standing  - Clip and Prep Other (please specifiy) (Operative site); Standing  - Cleanse with Chlorhexidine (CHG); Standing  - Diet NPO; Standing  - 0.9%  NaCl infusion  - IP VTE LOW RISK PATIENT; Standing  - Place JAKY hose; Standing  - Place sequential compression device; Standing  - ceFAZolin (ANCEF) 2 g in dextrose 5 % 50 mL IVPB  - CBC auto differential; Future  - Comprehensive metabolic panel; Future  - EKG 12-lead; Future  - Inpatient consult to Anesthesiology; Standing    3. Shoulder dislocation, right, subsequent encounter  - Full code; Standing  - Place in Outpatient; Standing  - Vital signs; Standing  - Insert peripheral IV; Standing  - Clip and Prep Other (please specifiy) (Operative site); Standing  - Cleanse with Chlorhexidine (CHG); Standing  - Diet NPO; Standing  - 0.9%  NaCl infusion  - IP VTE LOW RISK PATIENT; Standing  - Place JAKY hose; Standing  -  Place sequential compression device; Standing  - ceFAZolin (ANCEF) 2 g in dextrose 5 % 50 mL IVPB  - CBC auto differential; Future  - Comprehensive metabolic panel; Future  - EKG 12-lead; Future  - Inpatient consult to Anesthesiology; Standing    4. Shoulder impingement, right  - Full code; Standing  - Place in Outpatient; Standing  - Vital signs; Standing  - Insert peripheral IV; Standing  - Clip and Prep Other (please specifiy) (Operative site); Standing  - Cleanse with Chlorhexidine (CHG); Standing  - Diet NPO; Standing  - 0.9%  NaCl infusion  - IP VTE LOW RISK PATIENT; Standing  - Place JAKY hose; Standing  - Place sequential compression device; Standing  - ceFAZolin (ANCEF) 2 g in dextrose 5 % 50 mL IVPB  - CBC auto differential; Future  - Comprehensive metabolic panel; Future  - EKG 12-lead; Future  - Inpatient consult to Anesthesiology; Standing       Plan:  Plan for right shoulder arthroscopy, debridement, subacromial decompression, labral repair, and mini open rotator cuff repair at [facility]. The patient has been given preoperative instructions and prescriptions for post-operative medication. Post-operative appointment is scheduled for 2 weeks.

## 2022-05-12 NOTE — H&P (VIEW-ONLY)
Admission History & Physical    Subjective:    CC: Pain of the Right Shoulder and Follow-up (Right Shoulder Pain, states having intermitten aching in right shoulder at this time.)       HPI:  Shelby Taveras presents today for preoperative evaluation for right shoulder arthroscopy, debridement, subacromial decompression, labral repair, and mini open rotator cuff repair. I reviewed the indications for surgery. The risks and benefits of the proposed and alternative treatments were discussed with the patient. Questions pertinent to the procedure were solicited and answered. No assurances were given. Informed consent was obtained. The patient expressed good understanding and wished to proceed with scheduling the procedure.     ROS:   Constitutional: No fever, weakness, or fatigue.   Ear/Nose/Mouth/Throat: No nasal congestion or sore throat.   Respiratory: No shortness of breath or cough.   Cardiovascular: No chest pain, palpitations, or peripheral edema.   Gastrointestinal: No nausea, vomiting, or abdominal pain.   Genitourinary: No dysuria.  Musculoskeletal:  Right shoulder pain, swelling, loss of motion, instability.    Past Surgical History:   Procedure Laterality Date    TONSILLECTOMY          History reviewed. No pertinent past medical history.     Objective:    Vitals:    05/12/22 1012   BP: 120/73   Pulse: 71   Temp: 97.6 °F (36.4 °C)        Physical Exam:    Appearance: No distress, good color on room air. Alert and cooperative.  HEENT: Normocephalic. PERRLA EOM intact.   Lungs: Breathing unlabored.  Heart: Regular rate and rhythm.  Abdomen: Soft, non-tender.  No rebound tenderness.  Extremities:  Patient is well-nourished and well-developed, in no apparent distress, pleasant and cooperative. Examination of the right upper extremity compartments are soft and warm.  Skin is intact. There are no signs or symptoms of DVT or infection.   Patient is tender to palpation along the  the anterior lateral shoulder  aspect .  Patient is able to forward flex and abduct to 45°.  Greater than this, patient has hesitation and therefore limited exam.  Positive apprehension test. Neurovascularly intact distally.  Skin: No rashes or open wounds.        Assessment:  1. Traumatic complete tear of right rotator cuff, subsequent encounter  - Full code; Standing  - Place in Outpatient; Standing  - Vital signs; Standing  - Insert peripheral IV; Standing  - Clip and Prep Other (please specifiy) (Operative site); Standing  - Cleanse with Chlorhexidine (CHG); Standing  - Diet NPO; Standing  - 0.9%  NaCl infusion  - IP VTE LOW RISK PATIENT; Standing  - Place JAKY hose; Standing  - Place sequential compression device; Standing  - ceFAZolin (ANCEF) 2 g in dextrose 5 % 50 mL IVPB  - CBC auto differential; Future  - Comprehensive metabolic panel; Future  - EKG 12-lead; Future  - Inpatient consult to Anesthesiology; Standing    2. Labral tear of shoulder, right, subsequent encounter  - Full code; Standing  - Place in Outpatient; Standing  - Vital signs; Standing  - Insert peripheral IV; Standing  - Clip and Prep Other (please specifiy) (Operative site); Standing  - Cleanse with Chlorhexidine (CHG); Standing  - Diet NPO; Standing  - 0.9%  NaCl infusion  - IP VTE LOW RISK PATIENT; Standing  - Place JAKY hose; Standing  - Place sequential compression device; Standing  - ceFAZolin (ANCEF) 2 g in dextrose 5 % 50 mL IVPB  - CBC auto differential; Future  - Comprehensive metabolic panel; Future  - EKG 12-lead; Future  - Inpatient consult to Anesthesiology; Standing    3. Shoulder dislocation, right, subsequent encounter  - Full code; Standing  - Place in Outpatient; Standing  - Vital signs; Standing  - Insert peripheral IV; Standing  - Clip and Prep Other (please specifiy) (Operative site); Standing  - Cleanse with Chlorhexidine (CHG); Standing  - Diet NPO; Standing  - 0.9%  NaCl infusion  - IP VTE LOW RISK PATIENT; Standing  - Place JAKY hose; Standing  -  Place sequential compression device; Standing  - ceFAZolin (ANCEF) 2 g in dextrose 5 % 50 mL IVPB  - CBC auto differential; Future  - Comprehensive metabolic panel; Future  - EKG 12-lead; Future  - Inpatient consult to Anesthesiology; Standing    4. Shoulder impingement, right  - Full code; Standing  - Place in Outpatient; Standing  - Vital signs; Standing  - Insert peripheral IV; Standing  - Clip and Prep Other (please specifiy) (Operative site); Standing  - Cleanse with Chlorhexidine (CHG); Standing  - Diet NPO; Standing  - 0.9%  NaCl infusion  - IP VTE LOW RISK PATIENT; Standing  - Place JAKY hose; Standing  - Place sequential compression device; Standing  - ceFAZolin (ANCEF) 2 g in dextrose 5 % 50 mL IVPB  - CBC auto differential; Future  - Comprehensive metabolic panel; Future  - EKG 12-lead; Future  - Inpatient consult to Anesthesiology; Standing       Plan:  Plan for right shoulder arthroscopy, debridement, subacromial decompression, labral repair, and mini open rotator cuff repair at [facility]. The patient has been given preoperative instructions and prescriptions for post-operative medication. Post-operative appointment is scheduled for 2 weeks.

## 2022-05-17 RX ORDER — AMOXICILLIN 500 MG
1 CAPSULE ORAL DAILY
COMMUNITY

## 2022-05-17 RX ORDER — MULTIVIT WITH MINERALS/HERBS
1 TABLET ORAL DAILY
COMMUNITY

## 2022-05-17 RX ORDER — MAGNESIUM 250 MG
1 TABLET ORAL DAILY
COMMUNITY

## 2022-05-17 RX ORDER — ASCORBIC ACID 500 MG
1000 TABLET ORAL DAILY
COMMUNITY

## 2022-05-19 ENCOUNTER — ANESTHESIA EVENT (OUTPATIENT)
Dept: SURGERY | Facility: HOSPITAL | Age: 67
End: 2022-05-19
Payer: COMMERCIAL

## 2022-05-20 ENCOUNTER — ANESTHESIA (OUTPATIENT)
Dept: SURGERY | Facility: HOSPITAL | Age: 67
End: 2022-05-20
Payer: COMMERCIAL

## 2022-05-20 ENCOUNTER — HOSPITAL ENCOUNTER (OUTPATIENT)
Facility: HOSPITAL | Age: 67
Discharge: HOME | End: 2022-05-20
Attending: ORTHOPAEDIC SURGERY | Admitting: ORTHOPAEDIC SURGERY
Payer: COMMERCIAL

## 2022-05-20 DIAGNOSIS — S43.431D LABRAL TEAR OF SHOULDER, RIGHT, SUBSEQUENT ENCOUNTER: ICD-10-CM

## 2022-05-20 DIAGNOSIS — M25.811 SHOULDER IMPINGEMENT, RIGHT: ICD-10-CM

## 2022-05-20 DIAGNOSIS — S43.004D SHOULDER DISLOCATION, RIGHT, SUBSEQUENT ENCOUNTER: ICD-10-CM

## 2022-05-20 DIAGNOSIS — S46.011D TRAUMATIC COMPLETE TEAR OF RIGHT ROTATOR CUFF, SUBSEQUENT ENCOUNTER: ICD-10-CM

## 2022-05-20 PROCEDURE — 36000711: Performed by: ORTHOPAEDIC SURGERY

## 2022-05-20 PROCEDURE — 37000008 HC ANESTHESIA 1ST 15 MINUTES: Performed by: ORTHOPAEDIC SURGERY

## 2022-05-20 PROCEDURE — 23412 REPAIR ROTATOR CUFF CHRONIC: CPT | Mod: 51,RT,, | Performed by: ORTHOPAEDIC SURGERY

## 2022-05-20 PROCEDURE — 23412 PR REPAIR ROTATOR CUFF,CHRONIC: ICD-10-PCS | Mod: AS,RT,,

## 2022-05-20 PROCEDURE — 63600175 PHARM REV CODE 636 W HCPCS: Performed by: ANESTHESIOLOGY

## 2022-05-20 PROCEDURE — 37000009 HC ANESTHESIA EA ADD 15 MINS: Performed by: ORTHOPAEDIC SURGERY

## 2022-05-20 PROCEDURE — 71000015 HC POSTOP RECOV 1ST HR: Performed by: ORTHOPAEDIC SURGERY

## 2022-05-20 PROCEDURE — 25000003 PHARM REV CODE 250

## 2022-05-20 PROCEDURE — 27201423 OPTIME MED/SURG SUP & DEVICES STERILE SUPPLY: Performed by: ORTHOPAEDIC SURGERY

## 2022-05-20 PROCEDURE — 63600175 PHARM REV CODE 636 W HCPCS: Performed by: NURSE ANESTHETIST, CERTIFIED REGISTERED

## 2022-05-20 PROCEDURE — 71000033 HC RECOVERY, INTIAL HOUR: Performed by: ORTHOPAEDIC SURGERY

## 2022-05-20 PROCEDURE — 29806 SHO ARTHRS SRG CAPSULORRAPHY: CPT | Mod: RT,,, | Performed by: ORTHOPAEDIC SURGERY

## 2022-05-20 PROCEDURE — 63600175 PHARM REV CODE 636 W HCPCS: Performed by: ORTHOPAEDIC SURGERY

## 2022-05-20 PROCEDURE — 29806 PR SHLDR ARTHROSCOP,SURG,CAPSULORRHAPHY: ICD-10-PCS | Mod: RT,,, | Performed by: ORTHOPAEDIC SURGERY

## 2022-05-20 PROCEDURE — 25000003 PHARM REV CODE 250: Performed by: NURSE ANESTHETIST, CERTIFIED REGISTERED

## 2022-05-20 PROCEDURE — 23412 REPAIR ROTATOR CUFF CHRONIC: CPT | Mod: AS,RT,,

## 2022-05-20 PROCEDURE — 36000710: Performed by: ORTHOPAEDIC SURGERY

## 2022-05-20 PROCEDURE — 23412 PR REPAIR ROTATOR CUFF,CHRONIC: ICD-10-PCS | Mod: 51,RT,, | Performed by: ORTHOPAEDIC SURGERY

## 2022-05-20 PROCEDURE — 71000016 HC POSTOP RECOV ADDL HR: Performed by: ORTHOPAEDIC SURGERY

## 2022-05-20 PROCEDURE — C1713 ANCHOR/SCREW BN/BN,TIS/BN: HCPCS | Performed by: ORTHOPAEDIC SURGERY

## 2022-05-20 DEVICE — BIO-COMP SWIVELOCK C, CLD 5.5X19.1MM
Type: IMPLANTABLE DEVICE | Site: SHOULDER | Status: FUNCTIONAL
Brand: ARTHREX®

## 2022-05-20 DEVICE — SUTR ANCH,BIO-COMP P-LCK,2.9X 12.5MM
Type: IMPLANTABLE DEVICE | Site: SHOULDER | Status: FUNCTIONAL
Brand: ARTHREX®

## 2022-05-20 RX ORDER — ONDANSETRON 2 MG/ML
4 INJECTION INTRAMUSCULAR; INTRAVENOUS DAILY PRN
Status: DISCONTINUED | OUTPATIENT
Start: 2022-05-20 | End: 2022-05-20

## 2022-05-20 RX ORDER — EPINEPHRINE 1 MG/ML
INJECTION INTRAMUSCULAR; INTRAVENOUS; SUBCUTANEOUS
Status: DISCONTINUED | OUTPATIENT
Start: 2022-05-20 | End: 2022-05-20 | Stop reason: HOSPADM

## 2022-05-20 RX ORDER — MORPHINE SULFATE 4 MG/ML
4 INJECTION, SOLUTION INTRAMUSCULAR; INTRAVENOUS
Status: DISCONTINUED | OUTPATIENT
Start: 2022-05-20 | End: 2022-05-20 | Stop reason: HOSPADM

## 2022-05-20 RX ORDER — SODIUM CHLORIDE 9 MG/ML
INJECTION, SOLUTION INTRAVENOUS CONTINUOUS
Status: DISCONTINUED | OUTPATIENT
Start: 2022-05-20 | End: 2022-05-20 | Stop reason: HOSPADM

## 2022-05-20 RX ORDER — KETOROLAC TROMETHAMINE 30 MG/ML
INJECTION, SOLUTION INTRAMUSCULAR; INTRAVENOUS
Status: DISCONTINUED | OUTPATIENT
Start: 2022-05-20 | End: 2022-05-20

## 2022-05-20 RX ORDER — CEFAZOLIN SODIUM 1 G/3ML
INJECTION, POWDER, FOR SOLUTION INTRAMUSCULAR; INTRAVENOUS
Status: DISCONTINUED | OUTPATIENT
Start: 2022-05-20 | End: 2022-05-20

## 2022-05-20 RX ORDER — CALCIUM CARBONATE 200(500)MG
500 TABLET,CHEWABLE ORAL 3 TIMES DAILY PRN
Status: DISCONTINUED | OUTPATIENT
Start: 2022-05-20 | End: 2022-05-20 | Stop reason: HOSPADM

## 2022-05-20 RX ORDER — ROPIVACAINE HYDROCHLORIDE 5 MG/ML
INJECTION, SOLUTION EPIDURAL; INFILTRATION; PERINEURAL
Status: DISCONTINUED
Start: 2022-05-20 | End: 2022-05-20 | Stop reason: HOSPADM

## 2022-05-20 RX ORDER — HYDROMORPHONE HYDROCHLORIDE 2 MG/ML
0.4 INJECTION, SOLUTION INTRAMUSCULAR; INTRAVENOUS; SUBCUTANEOUS EVERY 5 MIN PRN
Status: DISCONTINUED | OUTPATIENT
Start: 2022-05-20 | End: 2022-05-20

## 2022-05-20 RX ORDER — MIDAZOLAM HYDROCHLORIDE 1 MG/ML
INJECTION INTRAMUSCULAR; INTRAVENOUS
Status: COMPLETED
Start: 2022-05-20 | End: 2022-05-20

## 2022-05-20 RX ORDER — MAG HYDROX/ALUMINUM HYD/SIMETH 200-200-20
30 SUSPENSION, ORAL (FINAL DOSE FORM) ORAL EVERY 6 HOURS PRN
Status: DISCONTINUED | OUTPATIENT
Start: 2022-05-20 | End: 2022-05-20 | Stop reason: HOSPADM

## 2022-05-20 RX ORDER — METOCLOPRAMIDE HYDROCHLORIDE 5 MG/ML
10 INJECTION INTRAMUSCULAR; INTRAVENOUS EVERY 6 HOURS PRN
Status: DISCONTINUED | OUTPATIENT
Start: 2022-05-20 | End: 2022-05-20 | Stop reason: HOSPADM

## 2022-05-20 RX ORDER — EPHEDRINE SULFATE 50 MG/ML
INJECTION, SOLUTION INTRAVENOUS
Status: DISCONTINUED | OUTPATIENT
Start: 2022-05-20 | End: 2022-05-20

## 2022-05-20 RX ORDER — ONDANSETRON 2 MG/ML
4 INJECTION INTRAMUSCULAR; INTRAVENOUS EVERY 6 HOURS PRN
Status: DISCONTINUED | OUTPATIENT
Start: 2022-05-20 | End: 2022-05-20 | Stop reason: HOSPADM

## 2022-05-20 RX ORDER — SODIUM CHLORIDE, SODIUM GLUCONATE, SODIUM ACETATE, POTASSIUM CHLORIDE AND MAGNESIUM CHLORIDE 30; 37; 368; 526; 502 MG/100ML; MG/100ML; MG/100ML; MG/100ML; MG/100ML
1000 INJECTION, SOLUTION INTRAVENOUS CONTINUOUS
Status: DISCONTINUED | OUTPATIENT
Start: 2022-05-20 | End: 2022-05-20

## 2022-05-20 RX ORDER — CEFAZOLIN SODIUM 1 G/3ML
2 INJECTION, POWDER, FOR SOLUTION INTRAMUSCULAR; INTRAVENOUS
Status: DISCONTINUED | OUTPATIENT
Start: 2022-05-20 | End: 2022-05-20

## 2022-05-20 RX ORDER — HALOPERIDOL 5 MG/ML
0.5 INJECTION INTRAMUSCULAR EVERY 10 MIN PRN
Status: DISCONTINUED | OUTPATIENT
Start: 2022-05-20 | End: 2022-05-20

## 2022-05-20 RX ORDER — PROPOFOL 10 MG/ML
VIAL (ML) INTRAVENOUS
Status: DISCONTINUED | OUTPATIENT
Start: 2022-05-20 | End: 2022-05-20

## 2022-05-20 RX ORDER — DEXAMETHASONE SODIUM PHOSPHATE 4 MG/ML
INJECTION, SOLUTION INTRA-ARTICULAR; INTRALESIONAL; INTRAMUSCULAR; INTRAVENOUS; SOFT TISSUE
Status: DISCONTINUED | OUTPATIENT
Start: 2022-05-20 | End: 2022-05-20

## 2022-05-20 RX ORDER — HYDROCODONE BITARTRATE AND ACETAMINOPHEN 10; 325 MG/1; MG/1
1 TABLET ORAL EVERY 8 HOURS PRN
Qty: 20 TABLET | Refills: 0 | Status: SHIPPED | OUTPATIENT
Start: 2022-05-20 | End: 2023-05-01

## 2022-05-20 RX ORDER — PHENYLEPHRINE HYDROCHLORIDE 10 MG/ML
INJECTION INTRAVENOUS
Status: DISCONTINUED | OUTPATIENT
Start: 2022-05-20 | End: 2022-05-20

## 2022-05-20 RX ORDER — MIDAZOLAM HYDROCHLORIDE 1 MG/ML
2 INJECTION INTRAMUSCULAR; INTRAVENOUS ONCE
Status: COMPLETED | OUTPATIENT
Start: 2022-05-20 | End: 2022-05-20

## 2022-05-20 RX ORDER — ROCURONIUM BROMIDE 10 MG/ML
INJECTION, SOLUTION INTRAVENOUS
Status: DISCONTINUED | OUTPATIENT
Start: 2022-05-20 | End: 2022-05-20

## 2022-05-20 RX ORDER — SODIUM CHLORIDE, SODIUM GLUCONATE, SODIUM ACETATE, POTASSIUM CHLORIDE AND MAGNESIUM CHLORIDE 30; 37; 368; 526; 502 MG/100ML; MG/100ML; MG/100ML; MG/100ML; MG/100ML
1000 INJECTION, SOLUTION INTRAVENOUS CONTINUOUS
Status: CANCELLED | OUTPATIENT
Start: 2022-05-20 | End: 2022-06-19

## 2022-05-20 RX ORDER — METHOCARBAMOL 500 MG/1
500 TABLET, FILM COATED ORAL EVERY 6 HOURS PRN
Status: DISCONTINUED | OUTPATIENT
Start: 2022-05-20 | End: 2022-05-20 | Stop reason: HOSPADM

## 2022-05-20 RX ORDER — EPINEPHRINE 1 MG/ML
INJECTION, SOLUTION INTRACARDIAC; INTRAMUSCULAR; INTRAVENOUS; SUBCUTANEOUS
Status: DISCONTINUED
Start: 2022-05-20 | End: 2022-05-20 | Stop reason: HOSPADM

## 2022-05-20 RX ORDER — MIDAZOLAM HYDROCHLORIDE 1 MG/ML
2 INJECTION INTRAMUSCULAR; INTRAVENOUS ONCE AS NEEDED
Status: COMPLETED | OUTPATIENT
Start: 2022-05-20 | End: 2022-05-20

## 2022-05-20 RX ORDER — ONDANSETRON 2 MG/ML
INJECTION INTRAMUSCULAR; INTRAVENOUS
Status: DISCONTINUED | OUTPATIENT
Start: 2022-05-20 | End: 2022-05-20

## 2022-05-20 RX ORDER — LIDOCAINE HYDROCHLORIDE 10 MG/ML
1 INJECTION, SOLUTION EPIDURAL; INFILTRATION; INTRACAUDAL; PERINEURAL ONCE
Status: CANCELLED | OUTPATIENT
Start: 2022-05-20 | End: 2022-05-20

## 2022-05-20 RX ORDER — LIDOCAINE HYDROCHLORIDE 20 MG/ML
INJECTION, SOLUTION EPIDURAL; INFILTRATION; INTRACAUDAL; PERINEURAL
Status: DISCONTINUED | OUTPATIENT
Start: 2022-05-20 | End: 2022-05-20

## 2022-05-20 RX ORDER — CEFAZOLIN SODIUM 2 G/50ML
2 SOLUTION INTRAVENOUS
Status: DISCONTINUED | OUTPATIENT
Start: 2022-05-20 | End: 2022-05-20 | Stop reason: HOSPADM

## 2022-05-20 RX ORDER — SODIUM CHLORIDE, SODIUM GLUCONATE, SODIUM ACETATE, POTASSIUM CHLORIDE AND MAGNESIUM CHLORIDE 30; 37; 368; 526; 502 MG/100ML; MG/100ML; MG/100ML; MG/100ML; MG/100ML
1000 INJECTION, SOLUTION INTRAVENOUS CONTINUOUS
Status: ACTIVE | OUTPATIENT
Start: 2022-05-20 | End: 2022-05-21

## 2022-05-20 RX ORDER — HYDROCODONE BITARTRATE AND ACETAMINOPHEN 5; 325 MG/1; MG/1
1 TABLET ORAL EVERY 4 HOURS PRN
Status: DISCONTINUED | OUTPATIENT
Start: 2022-05-20 | End: 2022-05-20 | Stop reason: HOSPADM

## 2022-05-20 RX ORDER — FENTANYL CITRATE 50 UG/ML
INJECTION, SOLUTION INTRAMUSCULAR; INTRAVENOUS
Status: DISCONTINUED | OUTPATIENT
Start: 2022-05-20 | End: 2022-05-20

## 2022-05-20 RX ADMIN — PHENYLEPHRINE HYDROCHLORIDE 100 MCG: 10 INJECTION INTRAVENOUS at 08:05

## 2022-05-20 RX ADMIN — DEXAMETHASONE SODIUM PHOSPHATE 4 MG: 4 INJECTION, SOLUTION INTRA-ARTICULAR; INTRALESIONAL; INTRAMUSCULAR; INTRAVENOUS; SOFT TISSUE at 07:05

## 2022-05-20 RX ADMIN — PHENYLEPHRINE HYDROCHLORIDE 100 MCG: 10 INJECTION INTRAVENOUS at 07:05

## 2022-05-20 RX ADMIN — ROCURONIUM BROMIDE 40 MG: 10 SOLUTION INTRAVENOUS at 07:05

## 2022-05-20 RX ADMIN — MIDAZOLAM HYDROCHLORIDE 2 MG: 1 INJECTION, SOLUTION INTRAMUSCULAR; INTRAVENOUS at 07:05

## 2022-05-20 RX ADMIN — CEFAZOLIN 2 G: 330 INJECTION, POWDER, FOR SOLUTION INTRAMUSCULAR; INTRAVENOUS at 07:05

## 2022-05-20 RX ADMIN — NORETHINDRONE AND ETHINYL ESTRADIOL 10 MG: KIT ORAL at 08:05

## 2022-05-20 RX ADMIN — ONDANSETRON HYDROCHLORIDE 4 MG: 2 SOLUTION INTRAMUSCULAR; INTRAVENOUS at 07:05

## 2022-05-20 RX ADMIN — KETOROLAC TROMETHAMINE 30 MG: 30 INJECTION, SOLUTION INTRAMUSCULAR at 09:05

## 2022-05-20 RX ADMIN — FENTANYL CITRATE 100 MCG: 50 INJECTION, SOLUTION INTRAMUSCULAR; INTRAVENOUS at 07:05

## 2022-05-20 RX ADMIN — LIDOCAINE HYDROCHLORIDE 5 ML: 20 INJECTION, SOLUTION EPIDURAL; INFILTRATION; INTRACAUDAL; PERINEURAL at 07:05

## 2022-05-20 RX ADMIN — PROPOFOL 120 MG: 10 INJECTION, EMULSION INTRAVENOUS at 07:05

## 2022-05-20 NOTE — ANESTHESIA POSTPROCEDURE EVALUATION
Anesthesia Post Evaluation    Patient: Shelby Taveras    Procedure(s) Performed: Procedure(s) (LRB):  ARTHROSCOPY, SHOULDER, WITH SUBACROMIAL SPACE DECOMPRESSION (Right)  DEBRIDEMENT, SHOULDER, ARTHROSCOPIC (Right)  ARTHROSCOPY, SHOULDER, WITH BANKART REPAIR (Right)  REPAIR, ROTATOR CUFF, ARTHROSCOPIC (Right)    Final Anesthesia Type: general      Patient location during evaluation: PACU  Patient participation: Yes- Able to Participate  Level of consciousness: awake and alert  Post-procedure vital signs: reviewed and stable  Pain management: adequate    PONV status at discharge: No PONV  Anesthetic complications: no      Cardiovascular status: hemodynamically stable  Respiratory status: unassisted and room air  Hydration status: euvolemic  Follow-up not needed.          Vitals Value Taken Time   BP 90/54 05/20/22 1031   Temp 36.5 °C (97.7 °F) 05/20/22 0922   Pulse 66 05/20/22 1036   Resp 17 05/20/22 1004   SpO2 100 % 05/20/22 1036   Vitals shown include unvalidated device data.      Event Time   Out of Recovery 09:57:00         Pain/Lambert Score: Lambert Score: 9 (5/20/2022  9:47 AM)

## 2022-05-20 NOTE — PLAN OF CARE
Preparing patient for discaharge, blood pressure came up. Arm in sling. Patient and spouse verbalize understanding of discharge instructions

## 2022-05-20 NOTE — OR NURSING
07:08  TIMEOUT DONE    07;13  DR. DAVIDSON WILL ATTEMPT TO DO A RIGHT INTERSCALENE NERVE BLOCK.    07:14  BLOCK COMPLETED AND TOLERATED WELL

## 2022-05-20 NOTE — ANESTHESIA PROCEDURE NOTES
Intubation    Date/Time: 5/20/2022 7:42 AM  Performed by: Nicola Chavez CRNA  Authorized by: Alysa Morgan MD     Intubation:     Induction:  Intravenous    Intubated:  Postinduction    Mask Ventilation:  Easy mask    Attempts:  1    Attempted By:  CRNA    Method of Intubation:  Direct    Blade:  Garcia 2    Laryngeal View Grade: Grade IIA - cords partially seen      Difficult Airway Encountered?: No      Complications:  None    Airway Device:  Oral endotracheal tube    Airway Device Size:  7.0    Style/Cuff Inflation:  Cuffed (inflated to minimal occlusive pressure)    Inflation Amount (mL):  6    Tube secured:  21    Secured at:  The lips    Placement Verified By:  Capnometry    Complicating Factors:  None    Findings Post-Intubation:  BS equal bilateral

## 2022-05-20 NOTE — TRANSFER OF CARE
"Anesthesia Transfer of Care Note    Patient: Shelby Taveras    Procedure(s) Performed: Procedure(s) (LRB):  ARTHROSCOPY, SHOULDER, WITH SUBACROMIAL SPACE DECOMPRESSION (Right)  DEBRIDEMENT, SHOULDER, ARTHROSCOPIC  ARTHROSCOPY, SHOULDER, WITH BANKART REPAIR (Right)  REPAIR, ROTATOR CUFF, ARTHROSCOPIC (Right)    Patient location: PACU    Anesthesia Type: general    Transport from OR: Transported from OR on room air with adequate spontaneous ventilation    Post pain: adequate analgesia    Post assessment: no apparent anesthetic complications and tolerated procedure well    Post vital signs: stable    Level of consciousness: awake and alert    Nausea/Vomiting: no nausea/vomiting    Complications: none    Transfer of care protocol was followed      Last vitals:   Visit Vitals  /61   Pulse 69   Temp 36.2 °C (97.2 °F)   Resp 16   Ht 5' 6" (1.676 m)   Wt 66.6 kg (146 lb 13.2 oz)   SpO2 98%   Breastfeeding No   BMI 23.70 kg/m²     "

## 2022-05-20 NOTE — ANESTHESIA PREPROCEDURE EVALUATION
05/20/2022  Shelby Taveras is a 67 y.o., female with Past Medical History:  Labral tear of shoulder      Comment:  right  And Past Surgical History:  Dental surgery      Comment:  wisdom teeth extraction, dental implant  Tonsillectomy   here for above  Overall very healthy.      Pre-op Assessment    I have reviewed the NPO Status.      Review of Systems      Physical Exam  General: Well nourished, Cooperative, Alert and Oriented    Airway:  Mallampati: II   Mouth Opening: Normal  TM Distance: Normal  Tongue: Normal  Neck ROM: Normal ROM    Dental:  Intact    Chest/Lungs:  Clear to auscultation, Normal Respiratory Rate    Heart:  Rate: Normal  Rhythm: Regular Rhythm        Anesthesia Plan  Type of Anesthesia, risks & benefits discussed:    Anesthesia Type: Gen ETT, Regional  Intra-op Monitoring Plan: Standard ASA Monitors  Post Op Pain Control Plan: multimodal analgesia, IV/PO Opioids PRN and peripheral nerve block  Induction:  IV  Airway Plan: Direct, Post-Induction  Informed Consent: Informed consent signed with the Patient and all parties understand the risks and agree with anesthesia plan.  All questions answered.   ASA Score: 1  Day of Surgery Review of History & Physical: H&P Update referred to the surgeon/provider.  Anesthesia Plan Notes: Premedication: Midazolam  Special Technique:Ofirmev intraop or postop if needed  Peripheral nerve block with Ropiv 0.5% for postop pain control  PONV prophylaxis  PACU postop     Ready For Surgery From Anesthesia Perspective.     .

## 2022-05-20 NOTE — BRIEF OP NOTE
[facility] - Periop Services  Brief Operative Note    Surgery Date: 5/20/2022     Surgeon(s) and Role:     * Franki Ivory MD - Primary    Assisting: EBONY Snyder    Pre-op Diagnosis:  Traumatic complete tear of right rotator cuff, subsequent encounter [S46.011D]  Labral tear of shoulder, right, subsequent encounter [S43.431D]  Shoulder dislocation, right, subsequent encounter [S43.004D]  Shoulder impingement, right [M75.41]  Right bicep tendon tear    Post-op Diagnosis:  Post-Op Diagnosis Codes:    Same as above    Procedure(s) (LRB):  ARTHROSCOPY, SHOULDER, WITH SUBACROMIAL SPACE DECOMPRESSION (Right)  DEBRIDEMENT, SHOULDER, ARTHROSCOPIC (Right)  ARTHROSCOPY, SHOULDER, WITH BANKART REPAIR (Right)  REPAIR, ROTATOR CUFF, ARTHROSCOPIC (Right)   Labral repair, and biceps tenotomy    Anesthesia: General    Operative Findings: see op report    Estimated Blood Loss: 30 mL         Specimens:   Specimen (24h ago, onward)            None            Discharge Note    OUTCOME: Patient tolerated treatment/procedure well without complication and is now ready for discharge.    DISPOSITION: Home or Self Care    FINAL DIAGNOSIS:  <principal problem not specified>    FOLLOWUP: In clinic    DISCHARGE INSTRUCTIONS:    Discharge Procedure Orders   Diet general     Ice to affected area     Lifting restrictions     No driving, operating heavy equipment or signing legal documents while taking pain medication.     Other restrictions (specify):   Order Comments: Ok to come out of sling for pendulum exercises, otherwise continue sling WITHOUT ABDUCTION PILLOW. No heavy lifting.     Remove dressing in 72 hours     Wound care routine (specify)   Order Comments: Wound care routine: keep dressing clean, dry, and intact. Ok to remove in 3 days and shower. No submersion.     Call MD for:  temperature >100.4     Call MD for:  persistent nausea and vomiting     Call MD for:  severe uncontrolled pain     Call MD for:   difficulty breathing, headache or visual disturbances     Call MD for:  redness, tenderness, or signs of infection (pain, swelling, redness, odor or green/yellow discharge around incision site)     Call MD for:  hives     Call MD for:  persistent dizziness or light-headedness     Call MD for:  extreme fatigue     Shower on day dressing removed (No bath)

## 2022-05-23 VITALS
HEART RATE: 65 BPM | WEIGHT: 146.81 LBS | TEMPERATURE: 98 F | DIASTOLIC BLOOD PRESSURE: 60 MMHG | RESPIRATION RATE: 18 BRPM | HEIGHT: 66 IN | BODY MASS INDEX: 23.59 KG/M2 | OXYGEN SATURATION: 100 % | SYSTOLIC BLOOD PRESSURE: 121 MMHG

## 2022-05-23 NOTE — OP NOTE
[facility]                   [address] [place] [address]    PATIENT NAME:      CARMEN FERRARI  YOB: 1955  CSN:               [id number]  MRN:               [id number]  ADMIT DATE:        05/20/2022 06:09:00  PHYSICIAN:         Franki Ivory MD                          OPERATIVE REPORT      DATE OF SURGERY:    05/20/2022 00:00:00    SURGEON:  Franki Ivory MD    HOSPITAL:  [facility].    SERVICE:  Orthopedics.    PREOPERATIVE DIAGNOSES:    1. Right shoulder massive, retracted rotator cuff tear.  2. Right shoulder dislocation with bony Bankart and labral tear.  3. Bicipital tendinitis and tearing.  4. Subacromial impingement, right shoulder.    POSTOPERATIVE DIAGNOSES:    1. Right shoulder massive, retracted rotator cuff tear.  2. Right shoulder dislocation with bony Bankart and labral tear.  3. Bicipital tendinitis and tearing.  4. Subacromial impingement, right shoulder.    PROCEDURES:    1. Right shoulder arthroscopy with debridement of unstable labral tear and   unstable cartilage from bony Bankart.  2. Arthroscopic biceps tenotomy.  3. Arthroscopic right shoulder labral repair.  4. Mini open right shoulder massive and retracted rotator cuff tear.  5. Right shoulder mini open subacromial decompression.    ASSISTANT:  EBONY Snyder.  PA was essential throughout key and critical   portions of the case, including soft tissue retraction, implant placement, and   wound closure.    ANESTHESIA:  LMA.    IV FLUIDS:  See Anesthesia.    ESTIMATED BLOOD LOSS:  Less than 50 cc.    COUNTS:  Correct.    COMPLICATIONS:  None.    IMPLANTS:  Arthrex anchor x3.    DISPOSITION:  Stable to PACU.    INDICATIONS FOR PROCEDURE:  Ms. Ferrari is a 67-year-old female who had a right   shoulder dislocation.  Patient was noted to have an unstable shoulder with a   massive, retracted rotator  cuff, underlying impingement, and bicipital   tendinitis as well as a glenoid defect causing recurrent instability of her   labral tear and bony Bankart.  Risks, benefits, and alternatives were discussed   with the patient in detail.  All questions were answered.  Informed consent was   obtained.    OPERATIVE NOTE IN DETAIL:  Patient was found in preoperative holding by   Anesthesia and found fit for surgery.  She was taken into  the operating room,   placed on the operating table in supine position.  All bony prominences were   well padded.  Timeout was called to identify correct patient, correct procedure,   correct side.  All in agreement.  Patient underwent LMA anesthesia without   complication.  She was then prepped and draped in normal sterile fashion,   leaving the right upper extremity exposed for surgery.  She was in the modified beach   chair position.  She received preoperative antibiotics.  Next, through the   posterior portal with good backflow, a 1 cm incision was made.  Camera was   introduced in the glenohumeral joint.  After this was done, anterior portal was   then made through a 1 cm incision just lateral to the tip of the coracoid.    Next, examination of the glenohumeral joint demonstrating a stepoff of   approximately 2 mm of the anterior inferior aspect of the glenoid.  She also had   a large and unstable anterior labral tear causing some instability.  She also   had full-thickness cartilage defect of the posterior aspect of the humeral head. Her   biceps tendon was completely flattened and partially torn throughout the entire   course of the intra-articular and groove itself.  Given this, patient underwent   an arthroscopic biceps tenotomy.  She also had debridement of the unstable   cartilage of the glenoid.  Next, with use of 2 Arthrex anchors, her anterior   labral tear was then repaired back, giving her a good bumper anteriorly to   prevent any further dislocation or subluxation of her  shoulder.  After this was   done, the inferior recess was inspected.  No loose bodies.  Attention was turned   laterally where patient had a massive and retracted rotator cuff.  This   involved the entire rotator cuff.  It was retracted back to about the   glenohumeral line.  Given this, a mini open 3 cm incision was made over the   anterolateral aspect of the right shoulder.  Soft tissue dissection down to the   deltoid where it was split in line with its fibers.  Care was taken not to go   passed 3 cm passed the tip of the acromion.  Next, the subacromial space was identified.  A   large amount of bursal tissue was removed.  She had scraping of the undersurface   of the acromion given her impingement and high-riding humeral head.  After the   subacromial decompression, the rotator cuff was identified on the posterior   aspect of the shoulder.  The infraspinatus was able to be grabbed with an Allis   clamp and brought back partially over the humeral head.  With use of Arthrex   anchor, it was then repaired back down.  The anterior and superior aspect of the   supraspinatus tendon was completely retracted out of view.  After this was   done, hemostasis was achieved.  Copious irrigation was used to wash the wound.    Fascia was closed with 0 Vicryl.  Subcutaneous tissue closed with 2-0 Vicryl.    Skin was closed with 3-0 Monocryl suture.  Mastisol, Steri-Strips, xeroform, 4 x   4's, soft tissue dressing, and a shoulder sling were placed on the right upper   extremity.  She was then awoken by Anesthesia and brought to PACU in stable   condition.        ______________________________  MD IFRAH Dia/SARAI  DD:  05/23/2022  Time:  07:46AM  DT:  05/23/2022  Time:  09:12AM  Job #:  [id number]      OPERATIVE REPORT

## 2022-06-06 ENCOUNTER — OFFICE VISIT (OUTPATIENT)
Dept: ORTHOPEDICS | Facility: CLINIC | Age: 67
End: 2022-06-06
Payer: COMMERCIAL

## 2022-06-06 VITALS — HEIGHT: 66 IN | BODY MASS INDEX: 23.46 KG/M2 | WEIGHT: 146 LBS

## 2022-06-06 DIAGNOSIS — S46.011D TRAUMATIC COMPLETE TEAR OF RIGHT ROTATOR CUFF, SUBSEQUENT ENCOUNTER: Primary | ICD-10-CM

## 2022-06-06 DIAGNOSIS — S43.431D LABRAL TEAR OF SHOULDER, RIGHT, SUBSEQUENT ENCOUNTER: ICD-10-CM

## 2022-06-06 DIAGNOSIS — S46.111D LABRAL TEAR OF LONG HEAD OF RIGHT BICEPS TENDON, SUBSEQUENT ENCOUNTER: ICD-10-CM

## 2022-06-06 PROCEDURE — 1101F PT FALLS ASSESS-DOCD LE1/YR: CPT | Mod: CPTII,,,

## 2022-06-06 PROCEDURE — 3288F FALL RISK ASSESSMENT DOCD: CPT | Mod: CPTII,,,

## 2022-06-06 PROCEDURE — 1160F RVW MEDS BY RX/DR IN RCRD: CPT | Mod: CPTII,,,

## 2022-06-06 PROCEDURE — 3288F PR FALLS RISK ASSESSMENT DOCUMENTED: ICD-10-PCS | Mod: CPTII,,,

## 2022-06-06 PROCEDURE — 1159F PR MEDICATION LIST DOCUMENTED IN MEDICAL RECORD: ICD-10-PCS | Mod: CPTII,,,

## 2022-06-06 PROCEDURE — 99024 PR POST-OP FOLLOW-UP VISIT: ICD-10-PCS | Mod: ,,,

## 2022-06-06 PROCEDURE — 3008F PR BODY MASS INDEX (BMI) DOCUMENTED: ICD-10-PCS | Mod: CPTII,,,

## 2022-06-06 PROCEDURE — 1160F PR REVIEW ALL MEDS BY PRESCRIBER/CLIN PHARMACIST DOCUMENTED: ICD-10-PCS | Mod: CPTII,,,

## 2022-06-06 PROCEDURE — 3008F BODY MASS INDEX DOCD: CPT | Mod: CPTII,,,

## 2022-06-06 PROCEDURE — 99024 POSTOP FOLLOW-UP VISIT: CPT | Mod: ,,,

## 2022-06-06 PROCEDURE — 1159F MED LIST DOCD IN RCRD: CPT | Mod: CPTII,,,

## 2022-06-06 PROCEDURE — 1101F PR PT FALLS ASSESS DOC 0-1 FALLS W/OUT INJ PAST YR: ICD-10-PCS | Mod: CPTII,,,

## 2022-06-06 NOTE — PROGRESS NOTES
Subjective:    CC: Post-op Evaluation (R SHOULDER SAD MINI OPEN RTC LABRAL REPAIR 5/20/22, GRADUALLY GETTING BETTER, NO COMPLAINTS)       HPI:  Patient returns to clinic for first post op visit. Status post right shoulder labral repair, mini open rotator cuff repair, and biceps tenotomy. Approximately 2 weeks out. The patients pain is manageable on OTC pain medication; not currently using narcotics.  She feels that she is improving.  The patient states no signs of infection. Patient presents today wearing a sling. No new complaints.    ROS: Refer to HPI for pertinent ROS. All other 12 point systems negative.    Objective:    There were no vitals filed for this visit.     Physical Exam:  Right upper extremity compartments are soft and warm. There are no signs or symptoms of DVT or infection. Incision sites are well healed, clean, and dry. Appropriately tender to palpation about incision site. Passive range of motion shows that the patient has 30 degrees of abduction and forward flexion; 50 degrees active ROM. No palpable crepitance or sign of dislocation with movement. Neurovascularly intact distally.    Images:  Previous Images Reviewed and discussed with patient.    Assessment:  1. Traumatic complete tear of right rotator cuff, subsequent encounter  - Ambulatory referral/consult to Physical/Occupational Therapy; Future    2. Labral tear of shoulder, right, subsequent encounter  - Ambulatory referral/consult to Physical/Occupational Therapy; Future    3. Labral tear of long head of right biceps tendon, subsequent encounter  - Ambulatory referral/consult to Physical/Occupational Therapy; Future       Plan:  Physical exam and intraoperative findings discussed with the patient. Wound care instructions given.  We will start formal physical therapy to gain range of motion. The Patient was instructed to take pain medication as needed with appropriate precautions and to refrain from heavy lifting. I would like to see the  patient back in 4 weeks to assess the patients progress.    Follow up: Follow up in about 4 weeks (around 7/4/2022).

## 2022-07-07 ENCOUNTER — OFFICE VISIT (OUTPATIENT)
Dept: ORTHOPEDICS | Facility: CLINIC | Age: 67
End: 2022-07-07
Payer: COMMERCIAL

## 2022-07-07 VITALS — HEIGHT: 66 IN | BODY MASS INDEX: 23.46 KG/M2 | WEIGHT: 145.94 LBS

## 2022-07-07 DIAGNOSIS — S46.011D TRAUMATIC COMPLETE TEAR OF RIGHT ROTATOR CUFF, SUBSEQUENT ENCOUNTER: Primary | ICD-10-CM

## 2022-07-07 PROCEDURE — 1159F PR MEDICATION LIST DOCUMENTED IN MEDICAL RECORD: ICD-10-PCS | Mod: CPTII,,, | Performed by: ORTHOPAEDIC SURGERY

## 2022-07-07 PROCEDURE — 3008F BODY MASS INDEX DOCD: CPT | Mod: CPTII,,, | Performed by: ORTHOPAEDIC SURGERY

## 2022-07-07 PROCEDURE — 3008F PR BODY MASS INDEX (BMI) DOCUMENTED: ICD-10-PCS | Mod: CPTII,,, | Performed by: ORTHOPAEDIC SURGERY

## 2022-07-07 PROCEDURE — 1101F PT FALLS ASSESS-DOCD LE1/YR: CPT | Mod: CPTII,,, | Performed by: ORTHOPAEDIC SURGERY

## 2022-07-07 PROCEDURE — 99024 PR POST-OP FOLLOW-UP VISIT: ICD-10-PCS | Mod: ,,, | Performed by: ORTHOPAEDIC SURGERY

## 2022-07-07 PROCEDURE — 1159F MED LIST DOCD IN RCRD: CPT | Mod: CPTII,,, | Performed by: ORTHOPAEDIC SURGERY

## 2022-07-07 PROCEDURE — 1160F RVW MEDS BY RX/DR IN RCRD: CPT | Mod: CPTII,,, | Performed by: ORTHOPAEDIC SURGERY

## 2022-07-07 PROCEDURE — 3288F FALL RISK ASSESSMENT DOCD: CPT | Mod: CPTII,,, | Performed by: ORTHOPAEDIC SURGERY

## 2022-07-07 PROCEDURE — 1160F PR REVIEW ALL MEDS BY PRESCRIBER/CLIN PHARMACIST DOCUMENTED: ICD-10-PCS | Mod: CPTII,,, | Performed by: ORTHOPAEDIC SURGERY

## 2022-07-07 PROCEDURE — 1101F PR PT FALLS ASSESS DOC 0-1 FALLS W/OUT INJ PAST YR: ICD-10-PCS | Mod: CPTII,,, | Performed by: ORTHOPAEDIC SURGERY

## 2022-07-07 PROCEDURE — 3288F PR FALLS RISK ASSESSMENT DOCUMENTED: ICD-10-PCS | Mod: CPTII,,, | Performed by: ORTHOPAEDIC SURGERY

## 2022-07-07 PROCEDURE — 99024 POSTOP FOLLOW-UP VISIT: CPT | Mod: ,,, | Performed by: ORTHOPAEDIC SURGERY

## 2022-07-07 NOTE — PROGRESS NOTES
Subjective:    CC: Follow-up of the Right Shoulder and Follow-up (SAD, mini open RTC, and labral repair 5/20/22, pt states her shoulder is doing well, therapy going good, also doing home exercises, ROM is getting better still cant lift above her head, overall doing great, advil prn, no other changes)       HPI:  Patient returns today for repeat exam.  Patient is 6 weeks from her right shoulder surgery.  She states she is doing much better she has very minimal if any pain.  She states she is going to physical therapy making good progress, she has some stiffness which is improving.    ROS: Refer to HPI for pertinent ROS. All other 12 point systems negative.    Objective:    Physical Exam:  Right upper extremity compartment soft and warm.  Skin is intact.  There is no signs symptoms of DVT infection.  Her incisions well healed she is able to forward flex and abduct 110° today.  She is otherwise stable to stressing, no instability, neurovascular intact distally.    Images: . Images Reviewed and discussed with patient.    Assessment:  1. Traumatic complete tear of right rotator cuff, subsequent encounter        Plan:  At this time we discussed her physical exam and intraoperative findings she is healing nicely she will continue physical therapy to regain her strength and motion.  I would like see back in 6 weeks to see how she is progressing.    Follow UP: Follow up in about 6 weeks (around 8/18/2022).

## 2022-08-04 ENCOUNTER — OFFICE VISIT (OUTPATIENT)
Dept: ORTHOPEDICS | Facility: CLINIC | Age: 67
End: 2022-08-04
Payer: COMMERCIAL

## 2022-08-04 VITALS — HEIGHT: 66 IN | WEIGHT: 147 LBS | BODY MASS INDEX: 23.63 KG/M2

## 2022-08-04 DIAGNOSIS — S46.011D TRAUMATIC COMPLETE TEAR OF RIGHT ROTATOR CUFF, SUBSEQUENT ENCOUNTER: Primary | ICD-10-CM

## 2022-08-04 DIAGNOSIS — S43.431D LABRAL TEAR OF SHOULDER, RIGHT, SUBSEQUENT ENCOUNTER: ICD-10-CM

## 2022-08-04 DIAGNOSIS — S43.004D SHOULDER DISLOCATION, RIGHT, SUBSEQUENT ENCOUNTER: ICD-10-CM

## 2022-08-04 PROCEDURE — 3008F PR BODY MASS INDEX (BMI) DOCUMENTED: ICD-10-PCS | Mod: CPTII,,,

## 2022-08-04 PROCEDURE — 3008F BODY MASS INDEX DOCD: CPT | Mod: CPTII,,,

## 2022-08-04 PROCEDURE — 1160F RVW MEDS BY RX/DR IN RCRD: CPT | Mod: CPTII,,,

## 2022-08-04 PROCEDURE — 1101F PR PT FALLS ASSESS DOC 0-1 FALLS W/OUT INJ PAST YR: ICD-10-PCS | Mod: CPTII,,,

## 2022-08-04 PROCEDURE — 99024 POSTOP FOLLOW-UP VISIT: CPT | Mod: ,,,

## 2022-08-04 PROCEDURE — 3288F PR FALLS RISK ASSESSMENT DOCUMENTED: ICD-10-PCS | Mod: CPTII,,,

## 2022-08-04 PROCEDURE — 1159F PR MEDICATION LIST DOCUMENTED IN MEDICAL RECORD: ICD-10-PCS | Mod: CPTII,,,

## 2022-08-04 PROCEDURE — 1101F PT FALLS ASSESS-DOCD LE1/YR: CPT | Mod: CPTII,,,

## 2022-08-04 PROCEDURE — 99024 PR POST-OP FOLLOW-UP VISIT: ICD-10-PCS | Mod: ,,,

## 2022-08-04 PROCEDURE — 1160F PR REVIEW ALL MEDS BY PRESCRIBER/CLIN PHARMACIST DOCUMENTED: ICD-10-PCS | Mod: CPTII,,,

## 2022-08-04 PROCEDURE — 1159F MED LIST DOCD IN RCRD: CPT | Mod: CPTII,,,

## 2022-08-04 PROCEDURE — 3288F FALL RISK ASSESSMENT DOCD: CPT | Mod: CPTII,,,

## 2022-08-04 NOTE — LETTER
[facility]  [address] [place] [address]. [address] [place], [address]  Phone: [phone]  Fax: [phone]    Name:Shelby Taveras  :1955   Date:2022       PATIENT IS ABLE TO RETURN TO WORK AS OF: Effective as of 22    [_] SEDENTARY WORK: Lifting 10 pounds maximum and occasionally lifting and/or carrying articles such as dockers, ledgers and small tools.  Although a sedentary job is defined as one which involved sitting, a certain amount of walking and standing are required only occasionally and other sedentary criteria are met.    [_] LIGHT WORK: Lifting 20 pounds with frequent lifting and/or carrying objects weighing up to 10 pounds.  Even though the weight lifted may be only a negotiable amount, a job is in the category when it involves sitting most of the time with a degree of pushing/pulling of arm and/or leg controls.    [_] MEDIUM WORK: Lifting of 50 pounds maximum with frequent lifting and/or carrying of objects up to 25 pounds.    [_] HEAVY WORK: Lifting of 100 pounds maximum with frequent lifting and/or carrying objects up to 50 pounds.    [_] VERY HEAVY WORK: Lifting objects in excess of 100 pounds with frequent lifting and/or carrying of objects weighing 50 pounds or more.    [_xx ] REGULAR DUTY: [_] No Restrictions. [_] With Restrictions (See comments below0:    COMMENTS

## 2022-08-04 NOTE — PROGRESS NOTES
Subjective:    CC: Follow-up (RT SHOULDER SAD, MINI OPEN RTC & LABRAL REPAIR 05/20/22-08/18/22. SHE IS DOING WELL. NO COMPLAINTS.)       HPI:  Patient returns to clinic for first post op visit. Status post Right shoulder arthroscopy, rotator cuff repair, and labral repair on 05/20/2022. Approximately  2.5 months out. The patients pain  Is much improved and denies any today; not taking any pain medication. The patient states no signs of infection.  She has been attending formal physical therapy and states she is having good progress. No new complaints.    ROS: Refer to HPI for pertinent ROS. All other 12 point systems negative.    Objective:    There were no vitals filed for this visit.     Physical Exam:    Her right upper extremity compartments are soft and warm. There are no signs or symptoms of DVT or infection. Incision sites are well healed, clean, and dry.  nontender to palpation about the shoulder. Passive range of motion shows that the patient has  165 degrees of abduction and forward flexion;  150 active ROM.  No palpable clunk or dislocation with movement. Neurovascularly intact distally.    Images:  Previous Images Reviewed and discussed with patient.    Assessment:  1. Traumatic complete tear of right rotator cuff, subsequent encounter    2. Labral tear of shoulder, right, subsequent encounter    3. Shoulder dislocation, right, subsequent encounter       Plan:  Physical exam and intraoperative findings discussed with the patient.    Patient is progressing well.  She has healed nicely.  She will finish out physical therapy.  Patient will contact us if she feels she would like to continue.  Patient states she feels ready to release.  She will call if any problems or difficulties.    Follow up: Follow up if symptoms worsen or fail to improve.

## 2023-04-24 ENCOUNTER — TELEPHONE (OUTPATIENT)
Dept: INTERNAL MEDICINE | Facility: CLINIC | Age: 68
End: 2023-04-24
Payer: COMMERCIAL

## 2023-04-24 NOTE — TELEPHONE ENCOUNTER
----- Message from Kimberly Belcher LPN sent at 4/24/2023  8:50 AM CDT -----  Regarding: GARY Quevedo 5/1/23 @1:00p  Are there any outstanding tasks in the chart? New patient     Is there any documentation of tasks? no    Has patient seen another physician, been to the ER, UCC, or admitted to hospital since last visit?    Has the patient done blood work or imaging since last visit?

## 2023-05-01 ENCOUNTER — OFFICE VISIT (OUTPATIENT)
Dept: INTERNAL MEDICINE | Facility: CLINIC | Age: 68
End: 2023-05-01
Payer: COMMERCIAL

## 2023-05-01 VITALS
DIASTOLIC BLOOD PRESSURE: 64 MMHG | SYSTOLIC BLOOD PRESSURE: 108 MMHG | OXYGEN SATURATION: 99 % | HEART RATE: 58 BPM | WEIGHT: 145 LBS | RESPIRATION RATE: 18 BRPM | BODY MASS INDEX: 24.16 KG/M2 | HEIGHT: 65 IN

## 2023-05-01 DIAGNOSIS — Z23 NEED FOR PNEUMOCOCCAL VACCINE: Primary | ICD-10-CM

## 2023-05-01 DIAGNOSIS — Z00.00 WELLNESS EXAMINATION: ICD-10-CM

## 2023-05-01 DIAGNOSIS — Z13.6 SCREENING FOR ISCHEMIC HEART DISEASE: ICD-10-CM

## 2023-05-01 DIAGNOSIS — M81.0 AGE-RELATED OSTEOPOROSIS WITHOUT CURRENT PATHOLOGICAL FRACTURE: ICD-10-CM

## 2023-05-01 PROBLEM — Z86.010 HISTORY OF COLON POLYPS: Status: ACTIVE | Noted: 2023-05-01

## 2023-05-01 PROBLEM — Z86.0100 HISTORY OF COLON POLYPS: Status: ACTIVE | Noted: 2023-05-01

## 2023-05-01 PROCEDURE — 3074F PR MOST RECENT SYSTOLIC BLOOD PRESSURE < 130 MM HG: ICD-10-PCS | Mod: CPTII,,, | Performed by: INTERNAL MEDICINE

## 2023-05-01 PROCEDURE — 3078F DIAST BP <80 MM HG: CPT | Mod: CPTII,,, | Performed by: INTERNAL MEDICINE

## 2023-05-01 PROCEDURE — 1159F MED LIST DOCD IN RCRD: CPT | Mod: CPTII,,, | Performed by: INTERNAL MEDICINE

## 2023-05-01 PROCEDURE — 1126F PR PAIN SEVERITY QUANTIFIED, NO PAIN PRESENT: ICD-10-PCS | Mod: CPTII,,, | Performed by: INTERNAL MEDICINE

## 2023-05-01 PROCEDURE — 1126F AMNT PAIN NOTED NONE PRSNT: CPT | Mod: CPTII,,, | Performed by: INTERNAL MEDICINE

## 2023-05-01 PROCEDURE — 3074F SYST BP LT 130 MM HG: CPT | Mod: CPTII,,, | Performed by: INTERNAL MEDICINE

## 2023-05-01 PROCEDURE — 1159F PR MEDICATION LIST DOCUMENTED IN MEDICAL RECORD: ICD-10-PCS | Mod: CPTII,,, | Performed by: INTERNAL MEDICINE

## 2023-05-01 PROCEDURE — 1160F RVW MEDS BY RX/DR IN RCRD: CPT | Mod: CPTII,,, | Performed by: INTERNAL MEDICINE

## 2023-05-01 PROCEDURE — 3078F PR MOST RECENT DIASTOLIC BLOOD PRESSURE < 80 MM HG: ICD-10-PCS | Mod: CPTII,,, | Performed by: INTERNAL MEDICINE

## 2023-05-01 PROCEDURE — 1101F PT FALLS ASSESS-DOCD LE1/YR: CPT | Mod: CPTII,,, | Performed by: INTERNAL MEDICINE

## 2023-05-01 PROCEDURE — 90677 PCV20 VACCINE IM: CPT | Mod: ,,, | Performed by: INTERNAL MEDICINE

## 2023-05-01 PROCEDURE — 3288F PR FALLS RISK ASSESSMENT DOCUMENTED: ICD-10-PCS | Mod: CPTII,,, | Performed by: INTERNAL MEDICINE

## 2023-05-01 PROCEDURE — 99397 PR PREVENTIVE VISIT,EST,65 & OVER: ICD-10-PCS | Mod: 25,,, | Performed by: INTERNAL MEDICINE

## 2023-05-01 PROCEDURE — 90471 PNEUMOCOCCAL CONJUGATE VACCINE 20-VALENT: ICD-10-PCS | Mod: ,,, | Performed by: INTERNAL MEDICINE

## 2023-05-01 PROCEDURE — 90471 IMMUNIZATION ADMIN: CPT | Mod: ,,, | Performed by: INTERNAL MEDICINE

## 2023-05-01 PROCEDURE — 3008F PR BODY MASS INDEX (BMI) DOCUMENTED: ICD-10-PCS | Mod: CPTII,,, | Performed by: INTERNAL MEDICINE

## 2023-05-01 PROCEDURE — 3008F BODY MASS INDEX DOCD: CPT | Mod: CPTII,,, | Performed by: INTERNAL MEDICINE

## 2023-05-01 PROCEDURE — 1160F PR REVIEW ALL MEDS BY PRESCRIBER/CLIN PHARMACIST DOCUMENTED: ICD-10-PCS | Mod: CPTII,,, | Performed by: INTERNAL MEDICINE

## 2023-05-01 PROCEDURE — 1101F PR PT FALLS ASSESS DOC 0-1 FALLS W/OUT INJ PAST YR: ICD-10-PCS | Mod: CPTII,,, | Performed by: INTERNAL MEDICINE

## 2023-05-01 PROCEDURE — 90677 PNEUMOCOCCAL CONJUGATE VACCINE 20-VALENT: ICD-10-PCS | Mod: ,,, | Performed by: INTERNAL MEDICINE

## 2023-05-01 PROCEDURE — 3288F FALL RISK ASSESSMENT DOCD: CPT | Mod: CPTII,,, | Performed by: INTERNAL MEDICINE

## 2023-05-01 PROCEDURE — 99397 PER PM REEVAL EST PAT 65+ YR: CPT | Mod: 25,,, | Performed by: INTERNAL MEDICINE

## 2023-05-01 NOTE — ASSESSMENT & PLAN NOTE
Health Maintenance Due   Topic Date Due    Hepatitis C Screening  Never done    COVID-19 Vaccine (5 - Booster for Moderna series) 08/03/2022       She plans to get a repeat bmd with Dr. Anderson this summer.  Labs from December reviewed.  WE mutually agreed to order some wellness labs prior to her visit next year since recent labs looked good.  Prevnar 20 done today.

## 2023-05-01 NOTE — PROGRESS NOTES
Subjective:      Chief Complaint: Establish Care      HPI: She is here to [facility].  No complaints.  She does have osteoporosis.  She has been on prolia injections intermittently and was due last summer.  But she didn't do it because she had a shoulder  Problem Noted   History of Colon Polyps 5/1/2023   Osteoporosis 5/1/2023   Wellness Examination 5/1/2023        The patient's Health Maintenance was reviewed and the following appears to be due:   Health Maintenance Due   Topic Date Due    Hepatitis C Screening  Never done    COVID-19 Vaccine (5 - Booster for Moderna series) 08/03/2022       Past Medical History:  Past Medical History:   Diagnosis Date    Labral tear of shoulder     right    Osteoporosis      Review of patient's allergies indicates:  No Known Allergies  Current Outpatient Medications on File Prior to Visit   Medication Sig Dispense Refill    ascorbic acid, vitamin C, (VITAMIN C) 500 MG tablet Take 1,000 mg by mouth once daily.      b complex vitamins tablet Take 1 tablet by mouth once daily.      ergocalciferol, vitamin D2, (VITAMIN D ORAL) Take 1 tablet by mouth once daily.      Lactobacillus acidophilus (PROBIOTIC ORAL) Take 1 tablet by mouth once daily.      LUTEIN ORAL Take 1 tablet by mouth once daily.      magnesium 250 mg Tab Take 1 tablet by mouth once daily.      omega-3 fatty acids/fish oil (FISH OIL-OMEGA-3 FATTY ACIDS) 300-1,000 mg capsule Take 1 capsule by mouth once daily.      soy isofla/blk cohosh/mag bark (ESTROVEN ORAL) Take 1 tablet by mouth once daily.      tretinoin (RETIN-A) 0.1 % cream 1 application every evening. Apply to affected area      [DISCONTINUED] ciclopirox (PENLAC) 8 % Soln 1 application once daily. Apply to affected area      [DISCONTINUED] HYDROcodone-acetaminophen (NORCO)  mg per tablet Take 1 tablet by mouth every 8 (eight) hours as needed for Pain. 20 tablet 0     No current facility-administered medications on file prior to visit.       Review of  "Systems    Objective:   /64 (BP Location: Right arm, Patient Position: Sitting, BP Method: Small (Manual))   Pulse (!) 58   Resp 18   Ht 5' 5.35" (1.66 m)   Wt 65.8 kg (145 lb)   SpO2 99%   BMI 23.87 kg/m²     Physical Exam  Constitutional:       General: She is not in acute distress.     Appearance: Normal appearance.   HENT:      Head: Normocephalic and atraumatic.   Eyes:      General: No scleral icterus.     Conjunctiva/sclera: Conjunctivae normal.   Neck:      Vascular: No carotid bruit.   Cardiovascular:      Rate and Rhythm: Normal rate and regular rhythm.      Pulses: Normal pulses.      Heart sounds: Normal heart sounds. No murmur heard.    No friction rub. No gallop.   Pulmonary:      Effort: Pulmonary effort is normal.      Breath sounds: Normal breath sounds.   Abdominal:      General: Bowel sounds are normal.      Palpations: Abdomen is soft. There is no mass.      Tenderness: There is no abdominal tenderness. There is no guarding or rebound.   Musculoskeletal:         General: No deformity.      Cervical back: No rigidity or tenderness.      Right lower leg: No edema.      Left lower leg: No edema.   Lymphadenopathy:      Cervical: No cervical adenopathy.   Skin:     Coloration: Skin is not jaundiced or pale.      Findings: No erythema.   Neurological:      General: No focal deficit present.      Mental Status: She is alert and oriented to person, place, and time.      Gait: Gait normal.   Psychiatric:         Mood and Affect: Mood normal.         Behavior: Behavior normal.         Thought Content: Thought content normal.         Judgment: Judgment normal.     Assessment and Plan:     Wellness examination  Health Maintenance Due   Topic Date Due    Hepatitis C Screening  Never done    COVID-19 Vaccine (5 - Booster for Moderna series) 08/03/2022       She plans to get a repeat bmd with Dr. Anderson this summer.  Labs from December reviewed.  WE mutually agreed to order some wellness labs " prior to her visit next year since recent labs looked good.  Prevnar 20 done today.    Osteoporosis  She has been intermittently compliant with her prolia.  We discussed risks and benefits of prolia vs. Actonel.  She will let me know if she wants  Me to take care of this for her (vs. Dr. Anderson).      Follow up in about 1 year (around 5/1/2024).       [unfilled]  Orders Placed This Encounter   Procedures    (In Office Administered) Pneumococcal Conjugate Vaccine (20 Valent) (IM)

## 2023-05-01 NOTE — ASSESSMENT & PLAN NOTE
She has been intermittently compliant with her prolia.  We discussed risks and benefits of prolia vs. Actonel.  She will let me know if she wants  Me to take care of this for her (vs. Dr. Anderson).

## 2023-07-31 PROBLEM — Z00.00 WELLNESS EXAMINATION: Status: RESOLVED | Noted: 2023-05-01 | Resolved: 2023-07-31

## 2024-05-29 ENCOUNTER — TELEPHONE (OUTPATIENT)
Dept: INTERNAL MEDICINE | Facility: CLINIC | Age: 69
End: 2024-05-29
Payer: COMMERCIAL

## 2024-05-29 NOTE — TELEPHONE ENCOUNTER
----- Message from Kimberly Belcher LPN sent at 5/29/2024  2:54 PM CDT -----  Regarding: GARY Quevedo 6/5/24 @10:20a  Are there any outstanding tasks in the chart? Pt will need fasting labs    Is there any documentation of tasks? no    Please tell patient to bring living will, power of , or advance directive document to visit if they have it.

## 2024-06-04 ENCOUNTER — LAB VISIT (OUTPATIENT)
Dept: LAB | Facility: HOSPITAL | Age: 69
End: 2024-06-04
Attending: INTERNAL MEDICINE
Payer: COMMERCIAL

## 2024-06-04 DIAGNOSIS — M81.0 AGE-RELATED OSTEOPOROSIS WITHOUT CURRENT PATHOLOGICAL FRACTURE: ICD-10-CM

## 2024-06-04 DIAGNOSIS — Z13.6 SCREENING FOR ISCHEMIC HEART DISEASE: ICD-10-CM

## 2024-06-04 DIAGNOSIS — Z00.00 WELLNESS EXAMINATION: ICD-10-CM

## 2024-06-04 LAB
ALBUMIN SERPL-MCNC: 3.9 G/DL (ref 3.4–4.8)
ALBUMIN/GLOB SERPL: 1.3 RATIO (ref 1.1–2)
ALP SERPL-CCNC: 28 UNIT/L (ref 40–150)
ALT SERPL-CCNC: 18 UNIT/L (ref 0–55)
ANION GAP SERPL CALC-SCNC: 7 MEQ/L
AST SERPL-CCNC: 18 UNIT/L (ref 5–34)
BASOPHILS # BLD AUTO: 0.05 X10(3)/MCL
BASOPHILS NFR BLD AUTO: 1 %
BILIRUB SERPL-MCNC: 0.5 MG/DL
BUN SERPL-MCNC: 21 MG/DL (ref 9.8–20.1)
CALCIUM SERPL-MCNC: 9.2 MG/DL (ref 8.4–10.2)
CHLORIDE SERPL-SCNC: 107 MMOL/L (ref 98–107)
CHOLEST SERPL-MCNC: 237 MG/DL
CHOLEST/HDLC SERPL: 3 {RATIO} (ref 0–5)
CO2 SERPL-SCNC: 28 MMOL/L (ref 23–31)
CREAT SERPL-MCNC: 0.83 MG/DL (ref 0.55–1.02)
CREAT/UREA NIT SERPL: 25
EOSINOPHIL # BLD AUTO: 0.14 X10(3)/MCL (ref 0–0.9)
EOSINOPHIL NFR BLD AUTO: 2.9 %
ERYTHROCYTE [DISTWIDTH] IN BLOOD BY AUTOMATED COUNT: 12.4 % (ref 11.5–17)
GFR SERPLBLD CREATININE-BSD FMLA CKD-EPI: >60 ML/MIN/1.73/M2
GLOBULIN SER-MCNC: 2.9 GM/DL (ref 2.4–3.5)
GLUCOSE SERPL-MCNC: 93 MG/DL (ref 82–115)
HCT VFR BLD AUTO: 44 % (ref 37–47)
HDLC SERPL-MCNC: 74 MG/DL (ref 35–60)
HGB BLD-MCNC: 14.4 G/DL (ref 12–16)
IMM GRANULOCYTES # BLD AUTO: 0.01 X10(3)/MCL (ref 0–0.04)
IMM GRANULOCYTES NFR BLD AUTO: 0.2 %
LDLC SERPL CALC-MCNC: 151 MG/DL (ref 50–140)
LYMPHOCYTES # BLD AUTO: 1.67 X10(3)/MCL (ref 0.6–4.6)
LYMPHOCYTES NFR BLD AUTO: 34.4 %
MCH RBC QN AUTO: 30.5 PG (ref 27–31)
MCHC RBC AUTO-ENTMCNC: 32.7 G/DL (ref 33–36)
MCV RBC AUTO: 93.2 FL (ref 80–94)
MONOCYTES # BLD AUTO: 0.37 X10(3)/MCL (ref 0.1–1.3)
MONOCYTES NFR BLD AUTO: 7.6 %
NEUTROPHILS # BLD AUTO: 2.62 X10(3)/MCL (ref 2.1–9.2)
NEUTROPHILS NFR BLD AUTO: 53.9 %
NRBC BLD AUTO-RTO: 0 %
PLATELET # BLD AUTO: 246 X10(3)/MCL (ref 130–400)
PMV BLD AUTO: 10.2 FL (ref 7.4–10.4)
POTASSIUM SERPL-SCNC: 4.7 MMOL/L (ref 3.5–5.1)
PROT SERPL-MCNC: 6.8 GM/DL (ref 5.8–7.6)
RBC # BLD AUTO: 4.72 X10(6)/MCL (ref 4.2–5.4)
SODIUM SERPL-SCNC: 142 MMOL/L (ref 136–145)
TRIGL SERPL-MCNC: 61 MG/DL (ref 37–140)
VLDLC SERPL CALC-MCNC: 12 MG/DL
WBC # SPEC AUTO: 4.86 X10(3)/MCL (ref 4.5–11.5)

## 2024-06-04 PROCEDURE — 85025 COMPLETE CBC W/AUTO DIFF WBC: CPT

## 2024-06-04 PROCEDURE — 36415 COLL VENOUS BLD VENIPUNCTURE: CPT

## 2024-06-04 PROCEDURE — 80061 LIPID PANEL: CPT

## 2024-06-04 PROCEDURE — 80053 COMPREHEN METABOLIC PANEL: CPT

## 2024-06-05 ENCOUNTER — OFFICE VISIT (OUTPATIENT)
Dept: INTERNAL MEDICINE | Facility: CLINIC | Age: 69
End: 2024-06-05
Payer: COMMERCIAL

## 2024-06-05 VITALS
HEIGHT: 66 IN | SYSTOLIC BLOOD PRESSURE: 108 MMHG | BODY MASS INDEX: 23.63 KG/M2 | WEIGHT: 147 LBS | HEART RATE: 55 BPM | DIASTOLIC BLOOD PRESSURE: 66 MMHG | OXYGEN SATURATION: 100 % | RESPIRATION RATE: 18 BRPM

## 2024-06-05 DIAGNOSIS — Z12.31 ENCOUNTER FOR SCREENING MAMMOGRAM FOR MALIGNANT NEOPLASM OF BREAST: ICD-10-CM

## 2024-06-05 DIAGNOSIS — M81.0 AGE-RELATED OSTEOPOROSIS WITHOUT CURRENT PATHOLOGICAL FRACTURE: ICD-10-CM

## 2024-06-05 DIAGNOSIS — Z00.00 WELLNESS EXAMINATION: ICD-10-CM

## 2024-06-05 DIAGNOSIS — E78.5 HYPERLIPIDEMIA, UNSPECIFIED HYPERLIPIDEMIA TYPE: Primary | ICD-10-CM

## 2024-06-05 PROCEDURE — 1126F AMNT PAIN NOTED NONE PRSNT: CPT | Mod: CPTII,,, | Performed by: INTERNAL MEDICINE

## 2024-06-05 PROCEDURE — 1160F RVW MEDS BY RX/DR IN RCRD: CPT | Mod: CPTII,,, | Performed by: INTERNAL MEDICINE

## 2024-06-05 PROCEDURE — 3008F BODY MASS INDEX DOCD: CPT | Mod: CPTII,,, | Performed by: INTERNAL MEDICINE

## 2024-06-05 PROCEDURE — 1101F PT FALLS ASSESS-DOCD LE1/YR: CPT | Mod: CPTII,,, | Performed by: INTERNAL MEDICINE

## 2024-06-05 PROCEDURE — 99397 PER PM REEVAL EST PAT 65+ YR: CPT | Mod: ,,, | Performed by: INTERNAL MEDICINE

## 2024-06-05 PROCEDURE — 1159F MED LIST DOCD IN RCRD: CPT | Mod: CPTII,,, | Performed by: INTERNAL MEDICINE

## 2024-06-05 PROCEDURE — 3074F SYST BP LT 130 MM HG: CPT | Mod: CPTII,,, | Performed by: INTERNAL MEDICINE

## 2024-06-05 PROCEDURE — 3288F FALL RISK ASSESSMENT DOCD: CPT | Mod: CPTII,,, | Performed by: INTERNAL MEDICINE

## 2024-06-05 PROCEDURE — 3078F DIAST BP <80 MM HG: CPT | Mod: CPTII,,, | Performed by: INTERNAL MEDICINE

## 2024-06-05 RX ORDER — DENOSUMAB 60 MG/ML
60 INJECTION SUBCUTANEOUS
COMMUNITY

## 2024-06-05 NOTE — ASSESSMENT & PLAN NOTE
Health Maintenance Due   Topic Date Due    Hepatitis C Screening  Never done    RSV Vaccine (Age 60+ and Pregnant patients) (1 - 1-dose 60+ series) Never done    COVID-19 Vaccine (6 - 2023-24 season) 09/01/2023       Recommended vaccines discussed.  I gave her her mmg order for November.  Recent labs reviewed and discussed.

## 2024-06-05 NOTE — PROGRESS NOTES
Subjective:      Chief Complaint: Annual Exam (Discuss labs )      HPI:She is her for a wellness.  No complaints.  She does exercise.  Problem Noted   Wellness Examination 5/1/2023   Hyperlipemia 6/5/2024   History of Colon Polyps 5/1/2023   Osteoporosis 5/1/2023        The patient's Health Maintenance was reviewed and the following appears to be due:   Health Maintenance Due   Topic Date Due    Hepatitis C Screening  Never done    RSV Vaccine (Age 60+ and Pregnant patients) (1 - 1-dose 60+ series) Never done    COVID-19 Vaccine (6 - 2023-24 season) 09/01/2023       Past Medical History:  Past Medical History:   Diagnosis Date    Labral tear of shoulder     right    Osteoporosis      Review of patient's allergies indicates:  No Known Allergies  Current Outpatient Medications on File Prior to Visit   Medication Sig Dispense Refill    ascorbic acid, vitamin C, (VITAMIN C) 500 MG tablet Take 1,000 mg by mouth once daily.      b complex vitamins tablet Take 1 tablet by mouth once daily.      denosumab (PROLIA) 60 mg/mL Syrg Inject 60 mg into the skin every 6 (six) months.      ergocalciferol, vitamin D2, (VITAMIN D ORAL) Take 1 tablet by mouth once daily.      Lactobacillus acidophilus (PROBIOTIC ORAL) Take 1 tablet by mouth once daily.      LUTEIN ORAL Take 1 tablet by mouth once daily.      magnesium 250 mg Tab Take 1 tablet by mouth once daily.      soy isofla/blk cohosh/mag bark (ESTROVEN ORAL) Take 1 tablet by mouth once daily.      tretinoin (RETIN-A) 0.1 % cream 1 application every evening. Apply to affected area      [DISCONTINUED] omega-3 fatty acids/fish oil (FISH OIL-OMEGA-3 FATTY ACIDS) 300-1,000 mg capsule Take 1 capsule by mouth once daily. (Patient not taking: Reported on 6/5/2024)       No current facility-administered medications on file prior to visit.       Review of Systems   Constitutional: Negative.        Objective:   /66 (BP Location: Right arm, Patient Position: Sitting, BP Method: Small  "(Manual))   Pulse (!) 55   Resp 18   Ht 5' 6.5" (1.689 m)   Wt 66.7 kg (147 lb)   SpO2 100%   BMI 23.37 kg/m²     Physical Exam  Constitutional:       General: She is not in acute distress.     Appearance: Normal appearance.   HENT:      Head: Normocephalic and atraumatic.   Eyes:      General: No scleral icterus.     Conjunctiva/sclera: Conjunctivae normal.   Neck:      Vascular: No carotid bruit.   Cardiovascular:      Rate and Rhythm: Normal rate and regular rhythm.      Pulses: Normal pulses.      Heart sounds: Normal heart sounds. No murmur heard.     No friction rub. No gallop.   Pulmonary:      Effort: Pulmonary effort is normal.      Breath sounds: Normal breath sounds.   Abdominal:      General: Bowel sounds are normal.      Palpations: Abdomen is soft. There is no mass.      Tenderness: There is no abdominal tenderness. There is no guarding or rebound.   Musculoskeletal:         General: No deformity.      Cervical back: No rigidity or tenderness.      Right lower leg: No edema.      Left lower leg: No edema.   Lymphadenopathy:      Cervical: No cervical adenopathy.   Skin:     Coloration: Skin is not jaundiced or pale.      Findings: No erythema.   Neurological:      General: No focal deficit present.      Mental Status: She is alert and oriented to person, place, and time.      Gait: Gait normal.   Psychiatric:         Mood and Affect: Mood normal.         Behavior: Behavior normal.         Thought Content: Thought content normal.         Judgment: Judgment normal.       Assessment and Plan:     Hyperlipemia  The 10-year ASCVD risk score (Stevan DK, et al., 2019) is: 5.5%    Values used to calculate the score:      Age: 69 years      Sex: Female      Is Non- : No      Diabetic: No      Tobacco smoker: No      Systolic Blood Pressure: 108 mmHg      Is BP treated: No      HDL Cholesterol: 74 mg/dL      Total Cholesterol: 237 mg/dL    I gave her some information on a low " cholesterol diet.    Wellness examination  Health Maintenance Due   Topic Date Due    Hepatitis C Screening  Never done    RSV Vaccine (Age 60+ and Pregnant patients) (1 - 1-dose 60+ series) Never done    COVID-19 Vaccine (6 - 2023-24 season) 09/01/2023       Recommended vaccines discussed.  I gave her her mmg order for November.  Recent labs reviewed and discussed.    Osteoporosis  She wants me to start managing the prolia.  I ordered it for August.  Repeat bmd next summer.      Follow up in about 1 year (around 6/5/2025).       [unfilled]  Orders Placed This Encounter   Procedures    Mammo Digital Screening Bilat w/ Rashi     Standing Status:   Future     Number of Occurrences:   1     Standing Expiration Date:   6/5/2025     Order Specific Question:   Has patient had radiation?     Answer:   No     Order Specific Question:   Has the patient had chemotherapy?     Answer:   No     Order Specific Question:   May the Radiologist modify the order per protocol to meet the clinical needs of the patient?     Answer:   Yes     Order Specific Question:   Release to patient     Answer:   Immediate

## 2024-06-05 NOTE — ASSESSMENT & PLAN NOTE
The 10-year ASCVD risk score (Stevan LIGHT, et al., 2019) is: 5.5%    Values used to calculate the score:      Age: 69 years      Sex: Female      Is Non- : No      Diabetic: No      Tobacco smoker: No      Systolic Blood Pressure: 108 mmHg      Is BP treated: No      HDL Cholesterol: 74 mg/dL      Total Cholesterol: 237 mg/dL    I gave her some information on a low cholesterol diet.

## 2024-08-12 ENCOUNTER — INFUSION (OUTPATIENT)
Dept: INFUSION THERAPY | Facility: HOSPITAL | Age: 69
End: 2024-08-12
Attending: INTERNAL MEDICINE
Payer: COMMERCIAL

## 2024-08-12 VITALS — SYSTOLIC BLOOD PRESSURE: 119 MMHG | DIASTOLIC BLOOD PRESSURE: 76 MMHG

## 2024-08-12 DIAGNOSIS — M81.0 AGE-RELATED OSTEOPOROSIS WITHOUT CURRENT PATHOLOGICAL FRACTURE: Primary | ICD-10-CM

## 2024-08-12 PROCEDURE — 63600175 PHARM REV CODE 636 W HCPCS: Mod: JZ,JG | Performed by: INTERNAL MEDICINE

## 2024-08-12 PROCEDURE — 96372 THER/PROPH/DIAG INJ SC/IM: CPT

## 2024-08-12 RX ADMIN — DENOSUMAB 60 MG: 60 INJECTION SUBCUTANEOUS at 04:08

## 2024-09-09 PROBLEM — Z00.00 WELLNESS EXAMINATION: Status: RESOLVED | Noted: 2023-05-01 | Resolved: 2024-09-09

## 2024-10-29 ENCOUNTER — TELEPHONE (OUTPATIENT)
Dept: ORTHOPEDICS | Facility: CLINIC | Age: 69
End: 2024-10-29
Payer: COMMERCIAL

## 2025-01-07 ENCOUNTER — TELEPHONE (OUTPATIENT)
Dept: INTERNAL MEDICINE | Facility: CLINIC | Age: 70
End: 2025-01-07
Payer: COMMERCIAL

## 2025-01-07 NOTE — TELEPHONE ENCOUNTER
----- Message from [employer] sent at 1/7/2025 10:01 AM CST -----  .Who Called: Shelby Taveras    Caller is requesting assistance/information from provider's office.    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 Please call to advise

## 2025-01-07 NOTE — TELEPHONE ENCOUNTER
Attempted to contact patient, left voicemail advising [facility], [facility] and Located within [facility] via voicemail.

## 2025-01-25 ENCOUNTER — PATIENT MESSAGE (OUTPATIENT)
Dept: INTERNAL MEDICINE | Facility: CLINIC | Age: 70
End: 2025-01-25
Payer: COMMERCIAL

## 2025-01-25 DIAGNOSIS — N63.0 MASS OF BREAST, UNSPECIFIED LATERALITY: Primary | ICD-10-CM

## 2025-02-11 ENCOUNTER — HOSPITAL ENCOUNTER (OUTPATIENT)
Dept: RADIOLOGY | Facility: HOSPITAL | Age: 70
Discharge: HOME | End: 2025-02-11
Attending: INTERNAL MEDICINE
Payer: COMMERCIAL

## 2025-02-11 DIAGNOSIS — R92.8 ABNORMAL FINDINGS ON DIAGNOSTIC IMAGING OF BREAST: ICD-10-CM

## 2025-02-11 PROCEDURE — 77065 DX MAMMO INCL CAD UNI: CPT | Mod: TC,LT

## 2025-02-11 PROCEDURE — 77065 DX MAMMO INCL CAD UNI: CPT | Mod: 26,LT,, | Performed by: RADIOLOGY

## 2025-02-11 PROCEDURE — 76642 ULTRASOUND BREAST LIMITED: CPT | Mod: TC,LT

## 2025-02-11 PROCEDURE — 76642 ULTRASOUND BREAST LIMITED: CPT | Mod: 26,LT,, | Performed by: RADIOLOGY

## 2025-02-11 PROCEDURE — 77061 BREAST TOMOSYNTHESIS UNI: CPT | Mod: 26,LT,, | Performed by: RADIOLOGY

## 2025-06-04 ENCOUNTER — TELEPHONE (OUTPATIENT)
Dept: INTERNAL MEDICINE | Facility: CLINIC | Age: 70
End: 2025-06-04
Payer: COMMERCIAL

## 2025-06-06 ENCOUNTER — LAB VISIT (OUTPATIENT)
Dept: LAB | Facility: HOSPITAL | Age: 70
End: 2025-06-06
Attending: INTERNAL MEDICINE
Payer: COMMERCIAL

## 2025-06-06 DIAGNOSIS — E78.5 HYPERLIPIDEMIA, UNSPECIFIED HYPERLIPIDEMIA TYPE: ICD-10-CM

## 2025-06-06 DIAGNOSIS — M81.0 AGE-RELATED OSTEOPOROSIS WITHOUT CURRENT PATHOLOGICAL FRACTURE: ICD-10-CM

## 2025-06-06 DIAGNOSIS — Z00.00 WELLNESS EXAMINATION: ICD-10-CM

## 2025-06-06 LAB
ALBUMIN SERPL-MCNC: 3.7 G/DL (ref 3.4–4.8)
ALBUMIN/GLOB SERPL: 1.2 RATIO (ref 1.1–2)
ALP SERPL-CCNC: 30 UNIT/L (ref 40–150)
ALT SERPL-CCNC: 12 UNIT/L (ref 0–55)
ANION GAP SERPL CALC-SCNC: 9 MEQ/L
AST SERPL-CCNC: 16 UNIT/L (ref 11–45)
BASOPHILS # BLD AUTO: 0.06 X10(3)/MCL
BASOPHILS NFR BLD AUTO: 1.1 %
BILIRUB SERPL-MCNC: 0.6 MG/DL
BUN SERPL-MCNC: 18.7 MG/DL (ref 9.8–20.1)
CALCIUM SERPL-MCNC: 8.7 MG/DL (ref 8.4–10.2)
CHLORIDE SERPL-SCNC: 108 MMOL/L (ref 98–107)
CHOLEST SERPL-MCNC: 224 MG/DL
CHOLEST/HDLC SERPL: 3 {RATIO} (ref 0–5)
CO2 SERPL-SCNC: 25 MMOL/L (ref 23–31)
CREAT SERPL-MCNC: 0.76 MG/DL (ref 0.55–1.02)
CREAT/UREA NIT SERPL: 25
EOSINOPHIL # BLD AUTO: 0.17 X10(3)/MCL (ref 0–0.9)
EOSINOPHIL NFR BLD AUTO: 3 %
ERYTHROCYTE [DISTWIDTH] IN BLOOD BY AUTOMATED COUNT: 12.4 % (ref 11.5–17)
GFR SERPLBLD CREATININE-BSD FMLA CKD-EPI: >60 ML/MIN/1.73/M2
GLOBULIN SER-MCNC: 3.2 GM/DL (ref 2.4–3.5)
GLUCOSE SERPL-MCNC: 87 MG/DL (ref 82–115)
HCT VFR BLD AUTO: 43 % (ref 37–47)
HDLC SERPL-MCNC: 69 MG/DL (ref 35–60)
HGB BLD-MCNC: 14 G/DL (ref 12–16)
IMM GRANULOCYTES # BLD AUTO: 0.01 X10(3)/MCL (ref 0–0.04)
IMM GRANULOCYTES NFR BLD AUTO: 0.2 %
LDLC SERPL CALC-MCNC: 147 MG/DL (ref 50–140)
LYMPHOCYTES # BLD AUTO: 1.45 X10(3)/MCL (ref 0.6–4.6)
LYMPHOCYTES NFR BLD AUTO: 25.5 %
MCH RBC QN AUTO: 29.9 PG (ref 27–31)
MCHC RBC AUTO-ENTMCNC: 32.6 G/DL (ref 33–36)
MCV RBC AUTO: 91.9 FL (ref 80–94)
MONOCYTES # BLD AUTO: 0.52 X10(3)/MCL (ref 0.1–1.3)
MONOCYTES NFR BLD AUTO: 9.1 %
NEUTROPHILS # BLD AUTO: 3.48 X10(3)/MCL (ref 2.1–9.2)
NEUTROPHILS NFR BLD AUTO: 61.1 %
NRBC BLD AUTO-RTO: 0 %
PLATELET # BLD AUTO: 251 X10(3)/MCL (ref 130–400)
PMV BLD AUTO: 10 FL (ref 7.4–10.4)
POTASSIUM SERPL-SCNC: 4.3 MMOL/L (ref 3.5–5.1)
PROT SERPL-MCNC: 6.9 GM/DL (ref 5.8–7.6)
RBC # BLD AUTO: 4.68 X10(6)/MCL (ref 4.2–5.4)
SODIUM SERPL-SCNC: 142 MMOL/L (ref 136–145)
TRIGL SERPL-MCNC: 42 MG/DL (ref 37–140)
VLDLC SERPL CALC-MCNC: 8 MG/DL
WBC # BLD AUTO: 5.69 X10(3)/MCL (ref 4.5–11.5)

## 2025-06-06 PROCEDURE — 36415 COLL VENOUS BLD VENIPUNCTURE: CPT

## 2025-06-06 PROCEDURE — 80061 LIPID PANEL: CPT

## 2025-06-06 PROCEDURE — 85025 COMPLETE CBC W/AUTO DIFF WBC: CPT

## 2025-06-06 PROCEDURE — 80053 COMPREHEN METABOLIC PANEL: CPT

## 2025-06-11 ENCOUNTER — OFFICE VISIT (OUTPATIENT)
Dept: INTERNAL MEDICINE | Facility: CLINIC | Age: 70
End: 2025-06-11
Payer: COMMERCIAL

## 2025-06-11 VITALS
OXYGEN SATURATION: 100 % | WEIGHT: 150 LBS | BODY MASS INDEX: 24.11 KG/M2 | HEART RATE: 62 BPM | HEIGHT: 66 IN | SYSTOLIC BLOOD PRESSURE: 116 MMHG | DIASTOLIC BLOOD PRESSURE: 72 MMHG | RESPIRATION RATE: 16 BRPM

## 2025-06-11 DIAGNOSIS — M81.0 AGE-RELATED OSTEOPOROSIS WITHOUT CURRENT PATHOLOGICAL FRACTURE: Primary | ICD-10-CM

## 2025-06-11 DIAGNOSIS — Z00.00 WELLNESS EXAMINATION: ICD-10-CM

## 2025-06-11 DIAGNOSIS — E78.5 HYPERLIPIDEMIA, UNSPECIFIED HYPERLIPIDEMIA TYPE: ICD-10-CM

## 2025-06-11 PROCEDURE — 1101F PT FALLS ASSESS-DOCD LE1/YR: CPT | Mod: CPTII,,, | Performed by: INTERNAL MEDICINE

## 2025-06-11 PROCEDURE — 1126F AMNT PAIN NOTED NONE PRSNT: CPT | Mod: CPTII,,, | Performed by: INTERNAL MEDICINE

## 2025-06-11 PROCEDURE — 99397 PER PM REEVAL EST PAT 65+ YR: CPT | Mod: ,,, | Performed by: INTERNAL MEDICINE

## 2025-06-11 PROCEDURE — 3074F SYST BP LT 130 MM HG: CPT | Mod: CPTII,,, | Performed by: INTERNAL MEDICINE

## 2025-06-11 PROCEDURE — 1159F MED LIST DOCD IN RCRD: CPT | Mod: CPTII,,, | Performed by: INTERNAL MEDICINE

## 2025-06-11 PROCEDURE — 3078F DIAST BP <80 MM HG: CPT | Mod: CPTII,,, | Performed by: INTERNAL MEDICINE

## 2025-06-11 PROCEDURE — 3008F BODY MASS INDEX DOCD: CPT | Mod: CPTII,,, | Performed by: INTERNAL MEDICINE

## 2025-06-11 PROCEDURE — 3288F FALL RISK ASSESSMENT DOCD: CPT | Mod: CPTII,,, | Performed by: INTERNAL MEDICINE

## 2025-06-11 RX ORDER — B-COMPLEX WITH VITAMIN C
1 TABLET ORAL DAILY
COMMUNITY

## 2025-06-11 NOTE — ASSESSMENT & PLAN NOTE
Health Maintenance Due   Topic Date Due    Hepatitis C Screening  Never done    COVID-19 Vaccine (6 - 2024-25 season) 09/01/2024       Recent labs reviewed and discussed. Ca recommended.  No other concerns.

## 2025-06-11 NOTE — PROGRESS NOTES
"Subjective:      Chief Complaint: Annual Exam      HPI:She is here for a wellness.  No complaints.  Problem Noted   Wellness Examination 5/1/2023   Hyperlipemia 6/5/2024   History of Colon Polyps 5/1/2023   Osteoporosis 5/1/2023        The patient's Health Maintenance was reviewed and the following appears to be due:   Health Maintenance Due   Topic Date Due    Hepatitis C Screening  Never done    COVID-19 Vaccine (6 - 2024-25 season) 09/01/2024       Past Medical History:  Past Medical History:   Diagnosis Date    Labral tear of shoulder     right    Osteoporosis      Review of patient's allergies indicates:  No Known Allergies  Medications Ordered Prior to Encounter[1]    Review of Systems   Constitutional: Negative.    HENT: Negative.     Eyes: Negative.    Respiratory: Negative.     Cardiovascular: Negative.    Gastrointestinal: Negative.    Genitourinary: Negative.    Musculoskeletal: Negative.    Skin: Negative.    Neurological: Negative.    Psychiatric/Behavioral: Negative.         Objective:   /72   Pulse 62   Resp 16   Ht 5' 6" (1.676 m)   Wt 68 kg (150 lb)   SpO2 100%   BMI 24.21 kg/m²     Physical Exam  Assessment and Plan:     Hyperlipemia  The 10-year ASCVD risk score (Stevan LIGHT, et al., 2019) is: 7%    Values used to calculate the score:      Age: 70 years      Sex: Female      Is Non- : No      Diabetic: No      Tobacco smoker: No      Systolic Blood Pressure: 116 mmHg      Is BP treated: No      HDL Cholesterol: 69 mg/dL      Total Cholesterol: 224 mg/dL      Wellness examination  Health Maintenance Due   Topic Date Due    Hepatitis C Screening  Never done    COVID-19 Vaccine (6 - 2024-25 season) 09/01/2024       Recent labs reviewed and discussed. Ca recommended.  No other concerns.    Osteoporosis  She is still on prolia per [facility].      Follow up in about 1 year (around 6/11/2026).       [unfilled]  Orders Placed This Encounter   Procedures    Lipid Panel     " Standing Status:   Future     Expected Date:   6/11/2026     Expiration Date:   8/10/2026    Comprehensive Metabolic Panel     Standing Status:   Future     Expected Date:   6/11/2026     Expiration Date:   8/10/2026    CBC Auto Differential     Standing Status:   Future     Expected Date:   6/11/2026     Expiration Date:   8/10/2026            [1]   Current Outpatient Medications on File Prior to Visit   Medication Sig Dispense Refill    B-complex with vitamin C (Z-BEC OR EQUIV) tablet Take 1 tablet by mouth once daily.      denosumab (PROLIA) 60 mg/mL Syrg Inject 60 mg into the skin every 6 (six) months.      ergocalciferol, vitamin D2, (VITAMIN D ORAL) Take 1 tablet by mouth once daily.      grape seed extract (GRAPE SEED ORAL) Take 1 tablet by mouth Daily.      Lactobacillus acidophilus (PROBIOTIC ORAL) Take 1 tablet by mouth once daily.      LUTEIN ORAL Take 1 tablet by mouth once daily.      magnesium 250 mg Tab Take 1 tablet by mouth once daily.      soy isofla/blk cohosh/mag bark (ESTROVEN ORAL) Take 1 tablet by mouth once daily.      tretinoin (RETIN-A) 0.1 % cream 1 application every evening. Apply to affected area      [DISCONTINUED] ascorbic acid, vitamin C, (VITAMIN C) 500 MG tablet Take 1,000 mg by mouth once daily.      [DISCONTINUED] b complex vitamins tablet Take 1 tablet by mouth once daily.       No current facility-administered medications on file prior to visit.

## 2025-06-11 NOTE — ASSESSMENT & PLAN NOTE
The 10-year ASCVD risk score (Stevan LIGHT, et al., 2019) is: 7%    Values used to calculate the score:      Age: 70 years      Sex: Female      Is Non- : No      Diabetic: No      Tobacco smoker: No      Systolic Blood Pressure: 116 mmHg      Is BP treated: No      HDL Cholesterol: 69 mg/dL      Total Cholesterol: 224 mg/dL

## 2025-07-30 ENCOUNTER — TELEPHONE (OUTPATIENT)
Dept: INTERNAL MEDICINE | Facility: CLINIC | Age: 70
End: 2025-07-30
Payer: COMMERCIAL

## 2025-07-30 DIAGNOSIS — Z78.0 POST-MENOPAUSAL: Primary | ICD-10-CM

## 2025-07-30 NOTE — TELEPHONE ENCOUNTER
Copied from CRM #[id number]. Topic: General Inquiry - Patient Advice  >> Jul 30, 2025  3:52 PM Orquidea wrote:  .Who Called: Shelby Taveras    Patient is returning phone call    Who Left Message for Patient:  Does the patient know what this is regarding?:Pt calling in to ask how to get Bone density test scheduled-pls advise       Preferred Method of Contact: Phone Call  Patient's Preferred Phone Number on File: [phone]   Best Call Back Number, if different:  Additional Information:   General Sunscreen Counseling: I recommended a broad spectrum sunscreen with a SPF of 30 or higher. Sunscreens should be applied at least 15 minutes prior to expected sun exposure and then every 2 hours after that as long as sun exposure continues. If swimming or exercising, consider applying every 45 minutes to an hour after getting wet or sweating.   I also recommended sun protective clothing and shade when possible. Detail Level: Detailed

## 2025-08-04 ENCOUNTER — HOSPITAL ENCOUNTER (OUTPATIENT)
Dept: RADIOLOGY | Facility: HOSPITAL | Age: 70
Discharge: HOME | End: 2025-08-04
Attending: INTERNAL MEDICINE
Payer: COMMERCIAL

## 2025-08-04 DIAGNOSIS — Z78.0 POST-MENOPAUSAL: ICD-10-CM

## 2025-08-04 PROCEDURE — 77080 DXA BONE DENSITY AXIAL: CPT | Mod: TC

## 2025-08-06 ENCOUNTER — TELEPHONE (OUTPATIENT)
Dept: INTERNAL MEDICINE | Facility: CLINIC | Age: 70
End: 2025-08-06
Payer: COMMERCIAL

## 2025-08-06 NOTE — TELEPHONE ENCOUNTER
Copied from CRM #[id number]. Topic: General Inquiry - Return Call  >> Aug 6, 2025  4:30 PM Nury Araiza wrote:  Who Called: Shelby Taveras    Patient is returning phone call    Who Left Message for Patient:Kimberly  Does the patient know what this is regarding?:bone density scan       Preferred Method of Contact: Phone Call  Patient's Preferred Phone Number on File: [phone]   Best Call Back Number, if different:  Additional Information: pt is in need of a call back please advise

## (undated) DEVICE — TUBE SET INFLOW/OUTFLOW

## (undated) DEVICE — CLOSURE SKIN STERI STRIP 1/2X4

## (undated) DEVICE — SEE MEDLINE ITEM 157160

## (undated) DEVICE — GLOVE PROTEXIS HYDROGEL SZ6.5

## (undated) DEVICE — CANNULA TWIST-IN 7MM X 7CM

## (undated) DEVICE — SCORPION NEEDLE

## (undated) DEVICE — Device

## (undated) DEVICE — LASSO SUTURE QUICKPASS 90DEG

## (undated) DEVICE — BLADE SURG CARBON STEEL SZ11

## (undated) DEVICE — GLOVE 6.5 PROTEXIS PI BLUE

## (undated) DEVICE — APPLICATOR CHLORAPREP ORN 26ML

## (undated) DEVICE — DRESSING TRANS 4X4 3/4

## (undated) DEVICE — PASSER TIGHT CRV QUICKPASS L

## (undated) DEVICE — COVER MAYO STAND REINFRCD 30

## (undated) DEVICE — FIBERTAPE 54 #2

## (undated) DEVICE — ELECTRODE PATIENT RETURN DISP

## (undated) DEVICE — DRAPE STERI U-SHAPED 47X51IN

## (undated) DEVICE — STOCKINET 4INX48

## (undated) DEVICE — GAUZE SPONGE 4'X4 12 PLY

## (undated) DEVICE — SUT 3-0 MONOCRYL PLUS PS-2

## (undated) DEVICE — BLADE SHAVER 4.5 6/BX

## (undated) DEVICE — BLADE SHAVER GREAT WHITE 3.5MM

## (undated) DEVICE — SUT VICRYL PLUS 2-0 CT1 18

## (undated) DEVICE — GLOVE PROTEXIS LTX MICRO 8

## (undated) DEVICE — PAD ABD 8X10 STERILE

## (undated) DEVICE — IMPLANTABLE DEVICE
Type: IMPLANTABLE DEVICE | Site: SHOULDER | Status: NON-FUNCTIONAL
Removed: 2022-05-20

## (undated) DEVICE — SUT VICRYL PLUS 0 CT1 18IN

## (undated) DEVICE — SYR 50CC LL

## (undated) DEVICE — KIT SURGICAL TURNOVER

## (undated) DEVICE — KIT FIXATION PERC ARTHSCP 2.9

## (undated) DEVICE — SOL NACL IRR 3000ML

## (undated) DEVICE — GOWN SURGICAL XX LARGE X LONG

## (undated) DEVICE — TAPE MEDIPORE 4IN X 2YDS

## (undated) DEVICE — NDL ANES SPINAL 18X3.5ST 18G

## (undated) DEVICE — GLOVE PROTEXIS BLUE LATEX 8.5

## (undated) DEVICE — BENZOIN TINCTURE CAPSULET